# Patient Record
Sex: MALE | Race: WHITE | NOT HISPANIC OR LATINO | URBAN - METROPOLITAN AREA
[De-identification: names, ages, dates, MRNs, and addresses within clinical notes are randomized per-mention and may not be internally consistent; named-entity substitution may affect disease eponyms.]

---

## 2021-02-11 DIAGNOSIS — Z23 ENCOUNTER FOR IMMUNIZATION: ICD-10-CM

## 2021-03-01 ENCOUNTER — IMMUNIZATIONS (OUTPATIENT)
Dept: FAMILY MEDICINE CLINIC | Facility: HOSPITAL | Age: 81
End: 2021-03-01

## 2021-03-01 DIAGNOSIS — Z23 ENCOUNTER FOR IMMUNIZATION: Primary | ICD-10-CM

## 2021-03-01 PROCEDURE — 0001A SARS-COV-2 / COVID-19 MRNA VACCINE (PFIZER-BIONTECH) 30 MCG: CPT

## 2021-03-01 PROCEDURE — 91300 SARS-COV-2 / COVID-19 MRNA VACCINE (PFIZER-BIONTECH) 30 MCG: CPT

## 2021-03-22 ENCOUNTER — IMMUNIZATIONS (OUTPATIENT)
Dept: FAMILY MEDICINE CLINIC | Facility: HOSPITAL | Age: 81
End: 2021-03-22

## 2021-03-22 DIAGNOSIS — Z23 ENCOUNTER FOR IMMUNIZATION: Primary | ICD-10-CM

## 2021-03-22 PROCEDURE — 91300 SARS-COV-2 / COVID-19 MRNA VACCINE (PFIZER-BIONTECH) 30 MCG: CPT

## 2021-03-22 PROCEDURE — 0002A SARS-COV-2 / COVID-19 MRNA VACCINE (PFIZER-BIONTECH) 30 MCG: CPT

## 2021-09-28 ENCOUNTER — IMMUNIZATIONS (OUTPATIENT)
Dept: FAMILY MEDICINE CLINIC | Facility: HOSPITAL | Age: 81
End: 2021-09-28

## 2021-09-28 DIAGNOSIS — Z23 ENCOUNTER FOR IMMUNIZATION: Primary | ICD-10-CM

## 2021-09-28 PROCEDURE — 91300 SARS-COV-2 / COVID-19 MRNA VACCINE (PFIZER-BIONTECH) 30 MCG: CPT

## 2021-09-28 PROCEDURE — 0001A SARS-COV-2 / COVID-19 MRNA VACCINE (PFIZER-BIONTECH) 30 MCG: CPT

## 2023-01-13 ENCOUNTER — TELEPHONE (OUTPATIENT)
Dept: SURGERY | Facility: CLINIC | Age: 83
End: 2023-01-13

## 2023-01-13 NOTE — TELEPHONE ENCOUNTER
REFERRLA FROM FAX  [facility]  DR MAR LYNNE  [phone]      CALLED PT TO SET CONSULT FOR  DX: MASS OF SUBCUTANIEOUS TISSUE OF RIGHT LOWER LEG  ICD10 : R22 41      No answer   Left detailed msg to call us to schedule

## 2023-02-03 ENCOUNTER — CONSULT (OUTPATIENT)
Dept: SURGERY | Facility: CLINIC | Age: 83
End: 2023-02-03

## 2023-02-03 VITALS
BODY MASS INDEX: 37.94 KG/M2 | HEART RATE: 75 BPM | HEIGHT: 70 IN | DIASTOLIC BLOOD PRESSURE: 98 MMHG | SYSTOLIC BLOOD PRESSURE: 144 MMHG | WEIGHT: 265 LBS

## 2023-02-03 DIAGNOSIS — M79.89 MASS OF SOFT TISSUE OF RIGHT LOWER EXTREMITY: Primary | ICD-10-CM

## 2023-02-03 RX ORDER — ATORVASTATIN CALCIUM 10 MG/1
TABLET, FILM COATED ORAL
COMMUNITY

## 2023-02-03 RX ORDER — ASPIRIN 81 MG/1
TABLET ORAL
COMMUNITY

## 2023-02-03 RX ORDER — OLMESARTAN MEDOXOMIL 20 MG/1
TABLET ORAL
COMMUNITY

## 2023-02-03 RX ORDER — METFORMIN HYDROCHLORIDE 500 MG/5ML
SOLUTION ORAL
COMMUNITY
Start: 2023-01-20

## 2023-02-03 RX ORDER — METHENAMINE HIPPURATE 1000 MG/1
TABLET ORAL
COMMUNITY
Start: 2022-06-01

## 2023-02-03 RX ORDER — LORAZEPAM 0.5 MG
TABLET ORAL
COMMUNITY
Start: 2022-02-01

## 2023-02-03 RX ORDER — OLMESARTAN MEDOXOMIL AND HYDROCHLOROTHIAZIDE 40/25 40; 25 MG/1; MG/1
TABLET ORAL
COMMUNITY

## 2023-02-03 NOTE — PROGRESS NOTES
Consult- General Surgery   Tamica Jones [de-identified] y o  male MRN: [id number]  Unit/Bed#:  Encounter: [id number]    Assessment/Plan     Assessment:  Soft tissue mass right lower extremity  History of hypertension  History of diabetes  History of hyperlipidemia  Morbid obesity  History of perforated diverticulitis with colovesical fistula, status post Rivera's and reversal 2018  Plan:  There is no evidence of growing or symptoms from the soft tissue mass, ultrasound was negative  I advised the patient for observation at this time  He will return to my office in 3 months for follow-up  History of Present Illness     HPI:  Xi Castle is a [de-identified] y o  male who presents to my office accompanied by his wife for evaluation of right lower extremity soft tissue mass  The patient noted a lump on the right lower extremity for approximately 3 months  He went to see his primary care physician and subsequently referred to us for surgical evaluation  In addition the patient had an ultrasound of this area, the actual test was performed in hospital in [place]  I read the report stating that there is no obvious cystic or solid lesion in the area, no evidence of mass on ultrasound  Patient does not recall any single event that provoked the lump, he denies having any trauma to the area  Patient is on baby aspirin  Review of Systems  The rest of the review of system total of 10 were negative except for the HPI  Historical Information   History reviewed  No pertinent past medical history  History reviewed  No pertinent surgical history    Social History   Social History     Substance and Sexual Activity   Alcohol Use None     Social History     Substance and Sexual Activity   Drug Use Not on file     Social History     Tobacco Use   Smoking Status Never   Smokeless Tobacco Never     Family History: non-contributory    Meds/Allergies   all medications and allergies reviewed     Current Outpatient Medications:   • aspirin (ECOTRIN LOW STRENGTH) 81 mg EC tablet, , Disp: , Rfl:   •  atorvastatin (LIPITOR) 10 mg tablet, , Disp: , Rfl:   •  Metformin HCl (RIOMET) 500 MG/5ML oral solution, , Disp: , Rfl:   •  methenamine hippurate (HIPREX) 1 g tablet, , Disp: , Rfl:   •  Misc Natural Products (Tart Cherry Advanced) CAPS, , Disp: , Rfl:   •  olmesartan (BENICAR) 20 mg tablet, , Disp: , Rfl:   •  olmesartan-hydrochlorothiazide (BENICAR HCT) 40-25 MG per tablet, 1 tab(s), Disp: , Rfl:   •  Probiotic Product (PROBIOTIC-10 PO), , Disp: , Rfl:   Not on File    Objective     Current Vitals:   Blood Pressure: 144/98 (02/03/23 1056)  Pulse: 75 (02/03/23 1056)  Height: 5' 10" (177 8 cm) (02/03/23 1056)  Weight - Scale: 120 kg (265 lb) (02/03/23 1056)    Physical Exam  Vitals and nursing note reviewed  Constitutional:       General: He is not in acute distress  Appearance: He is obese  Cardiovascular:      Rate and Rhythm: Normal rate and regular rhythm  Heart sounds: No murmur heard  Pulmonary:      Effort: No respiratory distress  Breath sounds: Normal breath sounds  Abdominal:      Palpations: Abdomen is soft  There is no mass  Tenderness: There is no abdominal tenderness  Comments: Abdomen is obese, soft, nondistended and nontender  There is a colostomy surgical scar without evidence of incisional hernia  Infraumbilical midline incision without evidence of incisional hernia  Musculoskeletal:      Comments: Both lower extremity have evidence of varicose veins, mild edema  There is a 3 cm soft tissue mass in the anterior aspect of the right lower extremity, appears to be adhered to deeper structures, no skin color changes  Skin:     General: Skin is warm  Coloration: Skin is not jaundiced  Findings: No erythema or rash  Neurological:      Mental Status: He is alert and oriented to person, place, and time  Cranial Nerves: No cranial nerve deficit     Psychiatric:         Mood and Affect: Mood normal          Behavior: Behavior normal

## 2023-05-02 RX ORDER — OMEPRAZOLE 20 MG/1
CAPSULE, DELAYED RELEASE ORAL
COMMUNITY

## 2023-05-04 ENCOUNTER — OFFICE VISIT (OUTPATIENT)
Dept: SURGERY | Facility: CLINIC | Age: 83
End: 2023-05-04

## 2023-05-04 VITALS
OXYGEN SATURATION: 93 % | HEART RATE: 88 BPM | TEMPERATURE: 98.8 F | BODY MASS INDEX: 38.39 KG/M2 | RESPIRATION RATE: 16 BRPM | HEIGHT: 70 IN | WEIGHT: 268.2 LBS | SYSTOLIC BLOOD PRESSURE: 152 MMHG | DIASTOLIC BLOOD PRESSURE: 80 MMHG

## 2023-05-04 DIAGNOSIS — M79.89 MASS OF SOFT TISSUE OF RIGHT LOWER EXTREMITY: Primary | ICD-10-CM

## 2023-05-04 NOTE — PROGRESS NOTES
Follow Up - General Surgery   Tamica Jones [de-identified] y o  male MRN: [id number]  Unit/Bed#:  Encounter: [id number]    Assessment/Plan     Assessment:  Soft tissue mass right lower extremity, resolved on its own  History of hypertension  History of diabetes  History hyperlipidemia  Morbid obesity with BMI of 38 4  History of perforated diverticulitis with colovesical fistula, status post Rivera's and reversal 2018  Plan:  She is doing well, no further work-up or intervention is needed since the lump resolved on its own  Patient is discharged from my care  History of Present Illness     HPI:  Rodney Diaz is a [de-identified] y o  male who presents to my office accompanied by his wife for follow-up  Patient stated that the lump has disappeared on its own  He denies having any pain, discomfort or any other constitutional symptoms  The patient was originally seen in my office on February 3, 2023 for a lump on the right lower extremity  Ultrasound was negative at that time  Review of Systems  The rest of the review of system total of 10 were negative except for the HPI  Historical Information   History reviewed  No pertinent past medical history  History reviewed  No pertinent surgical history    Social History   Social History     Substance and Sexual Activity   Alcohol Use Never     Social History     Substance and Sexual Activity   Drug Use Never     Social History     Tobacco Use   Smoking Status Former    Packs/day: 1 50    Years: 40 00    Pack years: 60 00    Types: Cigarettes    Quit date: 5    Years since quittin 3    Passive exposure: Past   Smokeless Tobacco Never     Family History: non-contributory    Meds/Allergies   all medications and allergies reviewed     Current Outpatient Medications:     aspirin (ECOTRIN LOW STRENGTH) 81 mg EC tablet, , Disp: , Rfl:     atorvastatin (LIPITOR) 10 mg tablet, , Disp: , Rfl:     Fish Oil Triglycerides 10 GM/100ML EMUL, , Disp: , Rfl:     Metformin "HCl (RIOMET) 500 MG/5ML oral solution, , Disp: , Rfl:     methenamine hippurate (HIPREX) 1 g tablet, , Disp: , Rfl:     Misc Natural Products (Tart Cherry Advanced) CAPS, , Disp: , Rfl:     olmesartan (BENICAR) 20 mg tablet, , Disp: , Rfl:     olmesartan-hydrochlorothiazide (BENICAR HCT) 40-25 MG per tablet, 1 tab(s), Disp: , Rfl:     omeprazole (PriLOSEC) 20 mg delayed release capsule, , Disp: , Rfl:     Probiotic Product (PROBIOTIC-10 PO), , Disp: , Rfl:   Allergies   Allergen Reactions    Apple - Food Allergy Throat Swelling    Cherry - Food Allergy Throat Swelling       Objective     Current Vitals:   Blood Pressure: 152/80 (05/04/23 1046)  Pulse: 88 (05/04/23 1046)  Temperature: 98 8 °F (37 1 °C) (05/04/23 1046)  Temp Source: Temporal (05/04/23 1046)  Respirations: 16 (05/04/23 1046)  Height: 5' 10\" (177 8 cm) (05/04/23 1046)  Weight - Scale: 122 kg (268 lb 3 2 oz) (05/04/23 1046)  SpO2: 93 % (05/04/23 1046)    Physical Exam  Vitals and nursing note reviewed  Constitutional:       General: He is not in acute distress  Appearance: He is obese  Cardiovascular:      Rate and Rhythm: Normal rate and regular rhythm  Pulmonary:      Effort: No respiratory distress  Breath sounds: Normal breath sounds  Abdominal:      Palpations: Abdomen is soft  There is no mass  Tenderness: There is no abdominal tenderness  Musculoskeletal:      Comments: There is no longer a soft tissue mass in the right lower extremity  Skin:     General: Skin is warm  Coloration: Skin is not jaundiced  Findings: No erythema or rash  Neurological:      Mental Status: He is alert and oriented to person, place, and time  Cranial Nerves: No cranial nerve deficit     Psychiatric:         Mood and Affect: Mood normal          Behavior: Behavior normal          "

## 2024-05-11 ENCOUNTER — HOSPITAL ENCOUNTER (INPATIENT)
Facility: HOSPITAL | Age: 84
LOS: 1 days | Discharge: HOME | DRG: 310 | End: 2024-05-13
Attending: EMERGENCY MEDICINE | Admitting: INTERNAL MEDICINE
Payer: MEDICARE

## 2024-05-11 ENCOUNTER — APPOINTMENT (EMERGENCY)
Dept: RADIOLOGY | Facility: HOSPITAL | Age: 84
DRG: 310 | End: 2024-05-11
Payer: MEDICARE

## 2024-05-11 DIAGNOSIS — R00.1 BRADYCARDIA: Primary | ICD-10-CM

## 2024-05-11 PROBLEM — E66.01 CLASS 2 SEVERE OBESITY DUE TO EXCESS CALORIES WITH SERIOUS COMORBIDITY AND BODY MASS INDEX (BMI) OF 38.0 TO 38.9 IN ADULT (HCC): Status: ACTIVE | Noted: 2024-05-11

## 2024-05-11 PROBLEM — R55 SYNCOPE: Status: ACTIVE | Noted: 2024-05-11

## 2024-05-11 PROBLEM — E11.9 DM2 (DIABETES MELLITUS, TYPE 2) (HCC): Status: ACTIVE | Noted: 2024-05-11

## 2024-05-11 LAB
2HR DELTA HS TROPONIN: -3 NG/L
ALBUMIN SERPL BCP-MCNC: 4 G/DL (ref 3.5–5)
ALP SERPL-CCNC: 87 U/L (ref 34–104)
ALT SERPL W P-5'-P-CCNC: 53 U/L (ref 7–52)
ANION GAP SERPL CALCULATED.3IONS-SCNC: 6 MMOL/L (ref 4–13)
AST SERPL W P-5'-P-CCNC: 35 U/L (ref 13–39)
BASOPHILS # BLD AUTO: 0.09 THOUSANDS/ÂΜL (ref 0–0.1)
BASOPHILS NFR BLD AUTO: 1 % (ref 0–1)
BILIRUB SERPL-MCNC: 0.6 MG/DL (ref 0.2–1)
BUN SERPL-MCNC: 21 MG/DL (ref 5–25)
CALCIUM SERPL-MCNC: 8.9 MG/DL (ref 8.4–10.2)
CARDIAC TROPONIN I PNL SERPL HS: 13 NG/L
CARDIAC TROPONIN I PNL SERPL HS: 16 NG/L
CHLORIDE SERPL-SCNC: 104 MMOL/L (ref 96–108)
CO2 SERPL-SCNC: 27 MMOL/L (ref 21–32)
CREAT SERPL-MCNC: 1.25 MG/DL (ref 0.6–1.3)
EOSINOPHIL # BLD AUTO: 0.32 THOUSAND/ÂΜL (ref 0–0.61)
EOSINOPHIL NFR BLD AUTO: 4 % (ref 0–6)
ERYTHROCYTE [DISTWIDTH] IN BLOOD BY AUTOMATED COUNT: 12.7 % (ref 11.6–15.1)
GFR SERPL CREATININE-BSD FRML MDRD: 53 ML/MIN/1.73SQ M
GLUCOSE SERPL-MCNC: 110 MG/DL (ref 65–140)
GLUCOSE SERPL-MCNC: 114 MG/DL (ref 65–140)
GLUCOSE SERPL-MCNC: 124 MG/DL (ref 65–140)
HCT VFR BLD AUTO: 39.2 % (ref 36.5–49.3)
HGB BLD-MCNC: 12.9 G/DL (ref 12–17)
IMM GRANULOCYTES # BLD AUTO: 0.02 THOUSAND/UL (ref 0–0.2)
IMM GRANULOCYTES NFR BLD AUTO: 0 % (ref 0–2)
LYMPHOCYTES # BLD AUTO: 1.3 THOUSANDS/ÂΜL (ref 0.6–4.47)
LYMPHOCYTES NFR BLD AUTO: 17 % (ref 14–44)
MCH RBC QN AUTO: 33.9 PG (ref 26.8–34.3)
MCHC RBC AUTO-ENTMCNC: 32.9 G/DL (ref 31.4–37.4)
MCV RBC AUTO: 103 FL (ref 82–98)
MONOCYTES # BLD AUTO: 0.98 THOUSAND/ÂΜL (ref 0.17–1.22)
MONOCYTES NFR BLD AUTO: 13 % (ref 4–12)
NEUTROPHILS # BLD AUTO: 4.76 THOUSANDS/ÂΜL (ref 1.85–7.62)
NEUTS SEG NFR BLD AUTO: 65 % (ref 43–75)
NRBC BLD AUTO-RTO: 0 /100 WBCS
PLATELET # BLD AUTO: 154 THOUSANDS/UL (ref 149–390)
PMV BLD AUTO: 11.5 FL (ref 8.9–12.7)
POTASSIUM SERPL-SCNC: 5.2 MMOL/L (ref 3.5–5.3)
PROT SERPL-MCNC: 7.1 G/DL (ref 6.4–8.4)
RBC # BLD AUTO: 3.81 MILLION/UL (ref 3.88–5.62)
SODIUM SERPL-SCNC: 137 MMOL/L (ref 135–147)
WBC # BLD AUTO: 7.47 THOUSAND/UL (ref 4.31–10.16)

## 2024-05-11 PROCEDURE — 99223 1ST HOSP IP/OBS HIGH 75: CPT | Performed by: INTERNAL MEDICINE

## 2024-05-11 PROCEDURE — 82948 REAGENT STRIP/BLOOD GLUCOSE: CPT

## 2024-05-11 PROCEDURE — 93005 ELECTROCARDIOGRAM TRACING: CPT

## 2024-05-11 PROCEDURE — 96361 HYDRATE IV INFUSION ADD-ON: CPT

## 2024-05-11 PROCEDURE — 36415 COLL VENOUS BLD VENIPUNCTURE: CPT | Performed by: EMERGENCY MEDICINE

## 2024-05-11 PROCEDURE — 85025 COMPLETE CBC W/AUTO DIFF WBC: CPT | Performed by: EMERGENCY MEDICINE

## 2024-05-11 PROCEDURE — 71045 X-RAY EXAM CHEST 1 VIEW: CPT

## 2024-05-11 PROCEDURE — 84484 ASSAY OF TROPONIN QUANT: CPT | Performed by: EMERGENCY MEDICINE

## 2024-05-11 PROCEDURE — 80053 COMPREHEN METABOLIC PANEL: CPT | Performed by: EMERGENCY MEDICINE

## 2024-05-11 PROCEDURE — 83036 HEMOGLOBIN GLYCOSYLATED A1C: CPT | Performed by: INTERNAL MEDICINE

## 2024-05-11 PROCEDURE — 99284 EMERGENCY DEPT VISIT MOD MDM: CPT | Performed by: EMERGENCY MEDICINE

## 2024-05-11 PROCEDURE — 96360 HYDRATION IV INFUSION INIT: CPT

## 2024-05-11 PROCEDURE — 99284 EMERGENCY DEPT VISIT MOD MDM: CPT

## 2024-05-11 RX ORDER — DOCUSATE SODIUM 100 MG/1
100 CAPSULE, LIQUID FILLED ORAL 2 TIMES DAILY
Status: DISCONTINUED | OUTPATIENT
Start: 2024-05-11 | End: 2024-05-13 | Stop reason: HOSPADM

## 2024-05-11 RX ORDER — INSULIN LISPRO 100 [IU]/ML
2-12 INJECTION, SOLUTION INTRAVENOUS; SUBCUTANEOUS
Status: DISCONTINUED | OUTPATIENT
Start: 2024-05-12 | End: 2024-05-13 | Stop reason: HOSPADM

## 2024-05-11 RX ORDER — METHENAMINE HIPPURATE 1000 MG/1
1 TABLET ORAL EVERY EVENING
Status: DISCONTINUED | OUTPATIENT
Start: 2024-05-11 | End: 2024-05-12

## 2024-05-11 RX ORDER — HYDROCHLOROTHIAZIDE 25 MG/1
25 TABLET ORAL DAILY
Status: DISCONTINUED | OUTPATIENT
Start: 2024-05-12 | End: 2024-05-13 | Stop reason: HOSPADM

## 2024-05-11 RX ORDER — ONDANSETRON 2 MG/ML
4 INJECTION INTRAMUSCULAR; INTRAVENOUS EVERY 6 HOURS PRN
Status: DISCONTINUED | OUTPATIENT
Start: 2024-05-11 | End: 2024-05-13 | Stop reason: HOSPADM

## 2024-05-11 RX ORDER — LOSARTAN POTASSIUM 50 MG/1
100 TABLET ORAL DAILY
Status: DISCONTINUED | OUTPATIENT
Start: 2024-05-12 | End: 2024-05-13 | Stop reason: HOSPADM

## 2024-05-11 RX ORDER — INSULIN LISPRO 100 [IU]/ML
2-12 INJECTION, SOLUTION INTRAVENOUS; SUBCUTANEOUS
Status: DISCONTINUED | OUTPATIENT
Start: 2024-05-11 | End: 2024-05-13 | Stop reason: HOSPADM

## 2024-05-11 RX ORDER — ATORVASTATIN CALCIUM 10 MG/1
10 TABLET, FILM COATED ORAL
Status: DISCONTINUED | OUTPATIENT
Start: 2024-05-12 | End: 2024-05-13 | Stop reason: HOSPADM

## 2024-05-11 RX ORDER — INSULIN GLARGINE 100 [IU]/ML
10 INJECTION, SOLUTION SUBCUTANEOUS
Status: DISCONTINUED | OUTPATIENT
Start: 2024-05-11 | End: 2024-05-13 | Stop reason: HOSPADM

## 2024-05-11 RX ORDER — MELOXICAM 15 MG/1
15 TABLET ORAL DAILY
COMMUNITY

## 2024-05-11 RX ORDER — ENOXAPARIN SODIUM 100 MG/ML
40 INJECTION SUBCUTANEOUS DAILY
Status: DISCONTINUED | OUTPATIENT
Start: 2024-05-12 | End: 2024-05-13 | Stop reason: HOSPADM

## 2024-05-11 RX ORDER — SODIUM CHLORIDE, SODIUM GLUCONATE, SODIUM ACETATE, POTASSIUM CHLORIDE, MAGNESIUM CHLORIDE, SODIUM PHOSPHATE, DIBASIC, AND POTASSIUM PHOSPHATE .53; .5; .37; .037; .03; .012; .00082 G/100ML; G/100ML; G/100ML; G/100ML; G/100ML; G/100ML; G/100ML
100 INJECTION, SOLUTION INTRAVENOUS CONTINUOUS
Status: DISCONTINUED | OUTPATIENT
Start: 2024-05-11 | End: 2024-05-12

## 2024-05-11 RX ORDER — PANTOPRAZOLE SODIUM 40 MG/1
40 TABLET, DELAYED RELEASE ORAL
Status: DISCONTINUED | OUTPATIENT
Start: 2024-05-12 | End: 2024-05-13 | Stop reason: HOSPADM

## 2024-05-11 RX ORDER — ACETAMINOPHEN 325 MG/1
975 TABLET ORAL EVERY 6 HOURS PRN
Status: DISCONTINUED | OUTPATIENT
Start: 2024-05-11 | End: 2024-05-13 | Stop reason: HOSPADM

## 2024-05-11 RX ORDER — MAGNESIUM HYDROXIDE/ALUMINUM HYDROXICE/SIMETHICONE 120; 1200; 1200 MG/30ML; MG/30ML; MG/30ML
30 SUSPENSION ORAL EVERY 6 HOURS PRN
Status: DISCONTINUED | OUTPATIENT
Start: 2024-05-11 | End: 2024-05-13 | Stop reason: HOSPADM

## 2024-05-11 RX ADMIN — SODIUM CHLORIDE, SODIUM GLUCONATE, SODIUM ACETATE, POTASSIUM CHLORIDE, MAGNESIUM CHLORIDE, SODIUM PHOSPHATE, DIBASIC, AND POTASSIUM PHOSPHATE 100 ML/HR: .53; .5; .37; .037; .03; .012; .00082 INJECTION, SOLUTION INTRAVENOUS at 23:01

## 2024-05-11 RX ADMIN — SODIUM CHLORIDE 1000 ML: 0.9 INJECTION, SOLUTION INTRAVENOUS at 17:18

## 2024-05-11 RX ADMIN — METHENAMINE HIPPURATE 1 G: 1 TABLET ORAL at 23:49

## 2024-05-12 ENCOUNTER — APPOINTMENT (OUTPATIENT)
Dept: NON INVASIVE DIAGNOSTICS | Facility: HOSPITAL | Age: 84
DRG: 310 | End: 2024-05-12
Payer: MEDICARE

## 2024-05-12 PROBLEM — E83.42 HYPOMAGNESEMIA: Status: ACTIVE | Noted: 2024-05-12

## 2024-05-12 LAB
ALBUMIN SERPL BCP-MCNC: 3.5 G/DL (ref 3.5–5)
ALP SERPL-CCNC: 76 U/L (ref 34–104)
ALT SERPL W P-5'-P-CCNC: 50 U/L (ref 7–52)
ANION GAP SERPL CALCULATED.3IONS-SCNC: 7 MMOL/L (ref 4–13)
AST SERPL W P-5'-P-CCNC: 33 U/L (ref 13–39)
ATRIAL RATE: 41 BPM
ATRIAL RATE: 68 BPM
BASOPHILS # BLD AUTO: 0.09 THOUSANDS/ÂΜL (ref 0–0.1)
BASOPHILS NFR BLD AUTO: 1 % (ref 0–1)
BILIRUB SERPL-MCNC: 0.81 MG/DL (ref 0.2–1)
BUN SERPL-MCNC: 16 MG/DL (ref 5–25)
CALCIUM SERPL-MCNC: 8.7 MG/DL (ref 8.4–10.2)
CHLORIDE SERPL-SCNC: 105 MMOL/L (ref 96–108)
CO2 SERPL-SCNC: 28 MMOL/L (ref 21–32)
CREAT SERPL-MCNC: 1.05 MG/DL (ref 0.6–1.3)
EOSINOPHIL # BLD AUTO: 0.41 THOUSAND/ÂΜL (ref 0–0.61)
EOSINOPHIL NFR BLD AUTO: 6 % (ref 0–6)
ERYTHROCYTE [DISTWIDTH] IN BLOOD BY AUTOMATED COUNT: 12.7 % (ref 11.6–15.1)
EST. AVERAGE GLUCOSE BLD GHB EST-MCNC: 174 MG/DL
GFR SERPL CREATININE-BSD FRML MDRD: 66 ML/MIN/1.73SQ M
GLUCOSE P FAST SERPL-MCNC: 118 MG/DL (ref 65–99)
GLUCOSE SERPL-MCNC: 118 MG/DL (ref 65–140)
GLUCOSE SERPL-MCNC: 126 MG/DL (ref 65–140)
GLUCOSE SERPL-MCNC: 159 MG/DL (ref 65–140)
GLUCOSE SERPL-MCNC: 168 MG/DL (ref 65–140)
GLUCOSE SERPL-MCNC: 99 MG/DL (ref 65–140)
HBA1C MFR BLD: 7.7 %
HCT VFR BLD AUTO: 38.6 % (ref 36.5–49.3)
HGB BLD-MCNC: 12.5 G/DL (ref 12–17)
IMM GRANULOCYTES # BLD AUTO: 0.01 THOUSAND/UL (ref 0–0.2)
IMM GRANULOCYTES NFR BLD AUTO: 0 % (ref 0–2)
LYMPHOCYTES # BLD AUTO: 1.61 THOUSANDS/ÂΜL (ref 0.6–4.47)
LYMPHOCYTES NFR BLD AUTO: 23 % (ref 14–44)
MAGNESIUM SERPL-MCNC: 1.5 MG/DL (ref 1.9–2.7)
MCH RBC QN AUTO: 33.6 PG (ref 26.8–34.3)
MCHC RBC AUTO-ENTMCNC: 32.4 G/DL (ref 31.4–37.4)
MCV RBC AUTO: 104 FL (ref 82–98)
MONOCYTES # BLD AUTO: 0.89 THOUSAND/ÂΜL (ref 0.17–1.22)
MONOCYTES NFR BLD AUTO: 13 % (ref 4–12)
NEUTROPHILS # BLD AUTO: 4.08 THOUSANDS/ÂΜL (ref 1.85–7.62)
NEUTS SEG NFR BLD AUTO: 57 % (ref 43–75)
NRBC BLD AUTO-RTO: 0 /100 WBCS
P AXIS: 86 DEGREES
P AXIS: 87 DEGREES
PLATELET # BLD AUTO: 144 THOUSANDS/UL (ref 149–390)
PMV BLD AUTO: 11.7 FL (ref 8.9–12.7)
POTASSIUM SERPL-SCNC: 4.6 MMOL/L (ref 3.5–5.3)
PR INTERVAL: 180 MS
PR INTERVAL: 190 MS
PROT SERPL-MCNC: 6.5 G/DL (ref 6.4–8.4)
QRS AXIS: 102 DEGREES
QRS AXIS: 102 DEGREES
QRSD INTERVAL: 138 MS
QRSD INTERVAL: 140 MS
QT INTERVAL: 456 MS
QT INTERVAL: 508 MS
QTC INTERVAL: 419 MS
QTC INTERVAL: 484 MS
RBC # BLD AUTO: 3.72 MILLION/UL (ref 3.88–5.62)
SODIUM SERPL-SCNC: 140 MMOL/L (ref 135–147)
T WAVE AXIS: 120 DEGREES
T WAVE AXIS: 127 DEGREES
TSH SERPL DL<=0.05 MIU/L-ACNC: 2.54 UIU/ML (ref 0.45–4.5)
VENTRICULAR RATE: 41 BPM
VENTRICULAR RATE: 68 BPM
WBC # BLD AUTO: 7.09 THOUSAND/UL (ref 4.31–10.16)

## 2024-05-12 PROCEDURE — 80053 COMPREHEN METABOLIC PANEL: CPT | Performed by: INTERNAL MEDICINE

## 2024-05-12 PROCEDURE — 83735 ASSAY OF MAGNESIUM: CPT | Performed by: INTERNAL MEDICINE

## 2024-05-12 PROCEDURE — 97165 OT EVAL LOW COMPLEX 30 MIN: CPT

## 2024-05-12 PROCEDURE — 84443 ASSAY THYROID STIM HORMONE: CPT | Performed by: NURSE PRACTITIONER

## 2024-05-12 PROCEDURE — 85025 COMPLETE CBC W/AUTO DIFF WBC: CPT | Performed by: INTERNAL MEDICINE

## 2024-05-12 PROCEDURE — 82948 REAGENT STRIP/BLOOD GLUCOSE: CPT

## 2024-05-12 PROCEDURE — 93010 ELECTROCARDIOGRAM REPORT: CPT | Performed by: INTERNAL MEDICINE

## 2024-05-12 PROCEDURE — 99232 SBSQ HOSP IP/OBS MODERATE 35: CPT | Performed by: NURSE PRACTITIONER

## 2024-05-12 PROCEDURE — 97162 PT EVAL MOD COMPLEX 30 MIN: CPT

## 2024-05-12 RX ORDER — METHENAMINE HIPPURATE 1000 MG/1
1 TABLET ORAL 2 TIMES DAILY
Status: DISCONTINUED | OUTPATIENT
Start: 2024-05-12 | End: 2024-05-13 | Stop reason: HOSPADM

## 2024-05-12 RX ORDER — MAGNESIUM SULFATE HEPTAHYDRATE 40 MG/ML
4 INJECTION, SOLUTION INTRAVENOUS ONCE
Qty: 100 ML | Refills: 0 | Status: COMPLETED | OUTPATIENT
Start: 2024-05-12 | End: 2024-05-13

## 2024-05-12 RX ADMIN — INSULIN LISPRO 2 UNITS: 100 INJECTION, SOLUTION INTRAVENOUS; SUBCUTANEOUS at 12:29

## 2024-05-12 RX ADMIN — ASPIRIN 81 MG: 81 TABLET, COATED ORAL at 11:00

## 2024-05-12 RX ADMIN — DOCUSATE SODIUM 100 MG: 100 CAPSULE, LIQUID FILLED ORAL at 17:18

## 2024-05-12 RX ADMIN — LOSARTAN POTASSIUM 100 MG: 50 TABLET, FILM COATED ORAL at 11:00

## 2024-05-12 RX ADMIN — INSULIN LISPRO 2 UNITS: 100 INJECTION, SOLUTION INTRAVENOUS; SUBCUTANEOUS at 17:20

## 2024-05-12 RX ADMIN — DOCUSATE SODIUM 100 MG: 100 CAPSULE, LIQUID FILLED ORAL at 11:00

## 2024-05-12 RX ADMIN — METHENAMINE HIPPURATE 1 G: 1 TABLET ORAL at 21:57

## 2024-05-12 RX ADMIN — METHENAMINE HIPPURATE 1 G: 1 TABLET ORAL at 10:59

## 2024-05-12 RX ADMIN — HYDROCHLOROTHIAZIDE 25 MG: 25 TABLET ORAL at 11:00

## 2024-05-12 RX ADMIN — ATORVASTATIN CALCIUM 10 MG: 10 TABLET, FILM COATED ORAL at 17:18

## 2024-05-12 RX ADMIN — MAGNESIUM SULFATE HEPTAHYDRATE 4 G: 40 INJECTION, SOLUTION INTRAVENOUS at 10:59

## 2024-05-12 RX ADMIN — PANTOPRAZOLE SODIUM 40 MG: 40 TABLET, DELAYED RELEASE ORAL at 05:04

## 2024-05-12 RX ADMIN — ENOXAPARIN SODIUM 40 MG: 40 INJECTION SUBCUTANEOUS at 11:00

## 2024-05-12 NOTE — OCCUPATIONAL THERAPY NOTE
Occupational Therapy Evaluation        Patient Name: Brian Jones  Today's Date: 5/12/2024 05/12/24 0848   OT Last Visit   OT Visit Date 05/12/24   Note Type   Note type Evaluation   Additional Comments Chart review indicates recent falls and recurent  syncopal episodes, indicating need for OT evaluation , despite documented ADL AMPAC score of 24.  Therapist discussed findings with Nursing staff, patient did agreed that he was independent  prior to being hospitalized, no OOB mobility completed to this point.   Pain Assessment   Pain Assessment Tool 0-10   Pain Score No Pain   Restrictions/Precautions   Weight Bearing Precautions Per Order No   Braces or Orthoses Other (Comment)  (none reported)   Home Living   Type of Home House   Home Layout Two level;Performs ADLs on one level;Stairs to enter with rails  (6 EMMANUEL+ 7 inside steps to the second floor)   Bathroom Shower/Tub Walk-in shower   Bathroom Toilet Standard   Bathroom Equipment Shower chair   Bathroom Accessibility Accessible   Home Equipment Other (Comment)  (none at baseline)   Prior Function   Level of [place] Independent with ADLs   Lives With Spouse;Family   Receives Help From Family   IADLs Independent with driving;Independent with meal prep;Independent with medication management   Falls in the last 6 months 1 to 4  (1 fall + 4 other syncopal episodes)   Vocational Retired   Lifestyle   Autonomy Patient reported independent  with ADLs/ IADLs. Patient lives with spouse in a 2 story house, 6 EMMANUEL+ 7 inside steps to the second floor. Patient ambulates without AD.   Reciprocal Relationships Supportive Family   Service to Others Retired Business Owner   Intrinsic Gratification President of [employer]   General   Family/Caregiver Present No   ADL   Where Assessed Edge of bed   Eating Assistance 7  Independent   Grooming Assistance 5  Supervision/Setup   UB Bathing Assistance 5  Supervision/Setup   LB Bathing Assistance  5  Supervision/Setup   UB Dressing Assistance 5  Supervision/Setup   LB Dressing Assistance 5  Supervision/Setup   Toileting Assistance  5  Supervision/Setup   Functional Assistance 5  Supervision/Setup   Bed Mobility   Supine to Sit 5  Supervision   Additional items Assist x 1;HOB elevated;Bedrails;Verbal cues   Additional Comments Pt reported mild dizziness upon sitting at EOB; resolved with a seated rest break.   Transfers   Sit to Stand 5  Supervision   Additional items Assist x 1;Increased time required;Verbal cues   Stand to Sit 5  Supervision   Additional items Assist x 1;Increased time required;Verbal cues   Functional Mobility   Functional Mobility 5  Supervision   Additional Comments close contact guard   Balance   Static Sitting Good   Dynamic Sitting Fair +   Static Standing Fair   Dynamic Standing Fair -   Activity Tolerance   Activity Tolerance Treatment limited secondary to medical complications (Comment)  (Pt reported mild dizziness upon sitting at EOB; resolved with a seated rest break.)   Medical Staff Made Aware Co-evaluation performed with OT secondary to complex medical condition of patient and regression of functional status from baseline   RUE Assessment   RUE Assessment WFL   LUE Assessment   LUE Assessment WFL   Hand Function   Gross Motor Coordination Functional   Fine Motor Coordination Functional   Sensation   Light Touch No apparent deficits  (BUEs)   Vision-Basic Assessment   Current Vision Wears glasses only for reading   Psychosocial   Psychosocial (WDL) WDL   Cognition   Overall Cognitive Status WFL   Arousal/Participation Alert;Responsive;Cooperative   Attention Within functional limits   Orientation Level Oriented X4   Memory Within functional limits   Following Commands Follows all commands and directions without difficulty   Assessment   Assessment Patient is a 83 y.o. male seen for OT evaluation s/p admit to [facility] on [facility]/11/2024 w/Syncope. Commorbidities  affecting patient's functional performance at time of assessment include:  class 2 severe obesity, bradycardia, type 2 DM, and hypomagnesemia. Presented to ED with recurrent syncopal episodes and overall weakness.  Orders placed for OT evaluation and treatment   Performed at least two patient identifiers during session including name and wristband. Prior to admission,  Patient reported independent with ADLs/ IADLs. Patient lives with spouse in a 2 story house, 6 EMMANUEL+ 7 inside steps to the second floor. Patient ambulates without AD. Upon evaluation, patient requires supervision and set up assist for UB ADLs, supervision and contact guard assist for LB ADLs, transfers and functional ambulation in room and bathroom with contact guard and close supervision assist. Presents with functional use of BUEs, with intact prehension, coordination and symmetrical muscle strength. Recommend supervised mobility in room secondary to mild unsteadiness with episodes of lightheadedness. Nursing staff made aware of therapy outcome. No further acute OT needs identified at this time to warrant continuation of services. D/C OT services. From OT standpoint, recommendation at time of d/c would be Home with family support.   Discharge Recommendation   Rehab Resource Intensity Level, OT No post-acute rehabilitation needs   AM-PAC Daily Activity Inpatient   Lower Body Dressing 3   Bathing 3   Toileting 3   Upper Body Dressing 4   Grooming 4   Eating 4   Daily Activity Raw Score 21   Daily Activity Standardized Score (Calc for Raw Score >=11) 44.27   AM-PAC Applied Cognition Inpatient   Following a Speech/Presentation 4   Understanding Ordinary Conversation 4   Taking Medications 4   Remembering Where Things Are Placed or Put Away 4   Remembering List of 4-5 Errands 4   Taking Care of Complicated Tasks 4   Applied Cognition Raw Score 24   Applied Cognition Standardized Score 62.21

## 2024-05-12 NOTE — PLAN OF CARE
Problem: PHYSICAL THERAPY ADULT  Goal: Performs mobility at highest level of function for planned discharge setting.  See evaluation for individualized goals.  Description: Treatment/Interventions: Functional transfer training, LE strengthening/ROM, Elevations, Therapeutic exercise, Endurance training, Patient/family training, Bed mobility, Gait training, Spoke to nursing, OT          See flowsheet documentation for full assessment, interventions and recommendations.  Note: Prognosis: Good  Problem List: Decreased strength, Decreased endurance, Impaired balance, Decreased mobility  Assessment: Pt is 83 year old male seen for PT evaluation s/p admit to [facility] on 5/11/2024 with Syncope. PT consulted to assess pt's functional mobility and discharge needs. Order placed for PT evaluation and treatment, with up and out of bed as tolerated order. Comorbidities affecting pt's physical performance at time of assessment include class 2 severe obesity, bradycardia, type 2 DM, and hypomagnesemia. Prior to hospitalization, pt was independent with all functional mobility without an AD. Pt ambulates unrestricted distances on all terrain and elevations. Pt resides with his spouse, in a two level house with six steps to enter. Personal factors affecting pt at time of initial evaluation include lives in a two story house, stairs to enter home, difficulty ambulating community distances, difficulty navigating level surfaces without external assistance, difficulty performing dynamic tasks in the community, positive fall history, and difficulty performing ADLs and IADLs. Please find objective findings from PT assessment regarding body systems outlined above with impairments and limitations including weakness, impaired balance, decreased endurance, gait deviations, decreased activity tolerance, decreased functional mobility tolerance, and fall risk. The following objective measures were performed on initial evaluation [place]  Index: 75/100, Modified [place]: 3 (moderate disability), and AM-PAC 6-Clicks: 19/24. Pt's clinical presentation is currently evolving seen in pt's presentation of need for ongoing medical management/monitoring, pt is a fall risk, and pt requires cues and assist for safety with functional mobility. Pt to benefit from continued PT treatment to address deficits as defined above and maximize pt's level of function and independence with mobility. From a PT standpoint, recommendation at time of discharge would be level 3, minimal resource intensity in order to facilitate return to prior level of function.    Barriers to Discharge: None     Rehab Resource Intensity Level, PT: III (Minimum Resource Intensity)    See flowsheet documentation for full assessment.

## 2024-05-12 NOTE — H&P
"[facility]   H&P  Name: Brian Jones I  MRN: [id number]  Unit/Bed#: -01 I Date of Admission: 5/11/2024   Date of Service: 5/11/2024 I Hospital Day: 0    * Syncope  Assessment & Plan  Reportedly had 3 episodes of passing out and shaking over the past 24 hours  EKG and that showed sinus bradycardia in the low 40s which subsequently improved  Patient has been on beta blocker since November; feeling sluggish  Improved with IV fluid hydration    Plan:  Monitor on telemetry   Check TSH, troponins, Blood sugars  IVF maintenance fluids  Echo to r/o cardiac etiology  Hold off on beta blocker      Type 2 diabetes mellitus without complication, without long-term current use of insulin ([facility])  Assessment & Plan  No results found for: \"HGBA1C\"    Recent Labs     05/11/24  1614   POCGLU 124       Blood Sugar Average: Last 72 hrs:  (P) 124  Hold home regimen while inpatient and resume on discharge  Diabetic diet  Insulin regimen  Lantus 10 units HS  Glucose checks and Insulin correction ACHS  Goal -180 while admitted, adjusting insulin regimen as appropriate  Monitor for hypoglycemia and treat per protocol      Bradycardia  Assessment & Plan  Pulse: 70   Bradycardic on EKG initially which did improve  Reportedly taking Toprol XL at home  Hold BB at this time  Monitor HR    Class 2 severe obesity due to excess calories with serious comorbidity and body mass index (BMI) of 38.0 to 38.9 in adult ([facility])  Assessment & Plan  Body mass index is 38.78 kg/m².    Recommend incorporating a more whole foods plant-predominant diet along with decreasing consumption of red meats and processed foods  Per AHA guidelines, recommend moderate-vigorous intensity exercise for 30 minutes a day for 5 days a week or a total of 150 min/week          VTE Pharmacologic Prophylaxis: VTE Score: 3 Moderate Risk (Score 3-4) - Pharmacological DVT Prophylaxis Ordered: enoxaparin (Lovenox).  Code Status: Level 1 - Full " "Code  Discussion with Patient/Family: patient    Anticipated Length of Stay: Patient will be admitted on an observation basis with an anticipated length of stay of less than 2 midnights secondary to plan above.    Total Time for Visit, including Counseling / Coordination of Care: 85 minutes Greater than 50% of this total time spent on direct patient counseling and coordination of care.    Chief Complaint:   Chief Complaint   Patient presents with    Medical Problem     Pt c/o several episodes over the past 2 weeks where \"I check out, I have a 5 second warning and then things go hazy and wavy for about 20 seconds and then I come back to\"        History of Present Illness:  Brian Jones is a 83 y.o. male who presents with 3 episodes of syncope.    PMHx of HTN, HLD, DM2    Reportedly for the past few days prior to admission has been having multiple episodes of light headed/dizziness intermittently. Denied doing strenous activity prior to these episodes. Reported that he had been on betablocker since November per his cardiologist in [place]. Also had nuclear stress testing performed which was reportedly negative (pending records to be received from [facility]).  Both patient and wife of patient feel that this may be 2/2 beta blocker medication which was split in half dosage a few months ago due to adverse effects.    Admitted for syncope work up and evaluation.    Review of Systems:  A 10-point review of systems was obtained.  Pertinent positives and negatives are outlined in the HPI above.  Remainder of review of systems are otherwise negative.     Past Medical and Surgical History:   History reviewed. No pertinent past medical history.    History reviewed. No pertinent surgical history.    Meds/Allergies:  Prior to Admission medications    Medication Sig Start Date End Date Taking? Authorizing Provider   aspirin (ECOTRIN LOW STRENGTH) 81 mg EC tablet     Historical Provider, MD   atorvastatin (LIPITOR) 10 mg tablet " "    Historical Provider, MD   Fish Oil Triglycerides 10 GM/100ML EMUL     Historical Provider, MD   Metformin HCl (RIOMET) 500 MG/5ML oral solution  23   Historical Provider, MD   methenamine hippurate (HIPREX) 1 g tablet  22   Historical Provider, MD   Misc Natural Products ([employer]) CAPS  22   Historical Provider, MD   olmesartan-hydrochlorothiazide (BENICAR HCT) 40-25 MG per tablet 1 tab(s)    Historical Provider, MD   omeprazole (PriLOSEC) 20 mg delayed release capsule     Historical Provider, MD   Probiotic Product (PROBIOTIC-10 PO)  6/3/22   Historical Provider, MD   olmesartan (BENICAR) 20 mg tablet   24  Historical Provider, MD     I have reviewed home medications with patient personally.    Allergies:   Allergies   Allergen Reactions    Apple - Food Allergy Throat Swelling    Cherry - Food Allergy Throat Swelling       Social History:  Marital Status: /Civil Union   Patient Pre-hospital Living Situation: Home  Patient Pre-hospital Level of Mobility: walks  Patient Pre-hospital Diet Restrictions: none  Substance Use History:   Social History     Substance and Sexual Activity   Alcohol Use Never     Social History     Tobacco Use   Smoking Status Former    Current packs/day: 0.00    Average packs/day: 1.5 packs/day for 40.0 years (60.0 ttl pk-yrs)    Types: Cigarettes    Start date:     Quit date:     Years since quittin.3    Passive exposure: Past   Smokeless Tobacco Never     Social History     Substance and Sexual Activity   Drug Use Never       Family History:  Family History   Problem Relation Age of Onset    Breast cancer additional onset Mother     No Known Problems Father        Physical Exam:     Vitals:   Blood Pressure: (!) 187/90 (24)  Pulse: (!) 44 (24)  Temperature: 98.4 °F (36.9 °C) (24 161)  Temp Source: Oral (24)  Respirations: 19 (24)  Height: 5' 10\" (177.8 cm) (24)  Weight - " Scale: 120 kg (265 lb 3.4 oz) (05/11/24 2234)  SpO2: 96 % (05/11/24 2202)    Physical Exam  Vitals reviewed.   Constitutional:       General: He is not in acute distress.     Appearance: He is well-developed. He is ill-appearing (Chronically). He is not diaphoretic.   HENT:      Head: Normocephalic and atraumatic.      Nose: Nose normal.   Eyes:      General: No scleral icterus.     Conjunctiva/sclera: Conjunctivae normal.      Pupils: Pupils are equal, round, and reactive to light.   Neck:      Thyroid: No thyromegaly.   Cardiovascular:      Rate and Rhythm: Normal rate and regular rhythm.      Heart sounds: Normal heart sounds. No murmur heard.  Pulmonary:      Effort: Pulmonary effort is normal.      Breath sounds: Normal breath sounds. No wheezing, rhonchi or rales.   Abdominal:      General: Bowel sounds are normal. There is no distension.      Palpations: Abdomen is soft.      Tenderness: There is no abdominal tenderness.   Musculoskeletal:         General: No swelling, tenderness or deformity.      Cervical back: Neck supple.   Skin:     General: Skin is warm and dry.   Neurological:      Mental Status: He is alert and oriented to person, place, and time. Mental status is at baseline.   Psychiatric:         Behavior: Behavior normal.         Thought Content: Thought content normal.         Judgment: Judgment normal.           Additional Data:     Lab Results:  Results from last 7 days   Lab Units 05/11/24  1718   WBC Thousand/uL 7.47   HEMOGLOBIN g/dL 12.9   HEMATOCRIT % 39.2   PLATELETS Thousands/uL 154   SEGS PCT % 65   LYMPHO PCT % 17   MONO PCT % 13*   EOS PCT % 4     Results from last 7 days   Lab Units 05/11/24  1718   SODIUM mmol/L 137   POTASSIUM mmol/L 5.2   CHLORIDE mmol/L 104   CO2 mmol/L 27   BUN mg/dL 21   CREATININE mg/dL 1.25   ANION GAP mmol/L 6   CALCIUM mg/dL 8.9   ALBUMIN g/dL 4.0   TOTAL BILIRUBIN mg/dL 0.60   ALK PHOS U/L 87   ALT U/L 53*   AST U/L 35   GLUCOSE RANDOM mg/dL 114          Results from last 7 days   Lab Units 05/11/24  2236 05/11/24  1614   POC GLUCOSE mg/dl 110 124               Imaging Results Reviewed as noted below  XR chest 1 view portable    (Results Pending)       No results found.  No Chest XR results available for this patient.       EKG and Other Studies Reviewed on Admission:   EKG: NSR, previously sinus pato    Recent Labs     05/11/24  1621   VENTRATE 68         ** Please Note: This note has been constructed using a voice recognition system. **

## 2024-05-12 NOTE — PLAN OF CARE
Problem: PAIN - ADULT  Goal: Verbalizes/displays adequate comfort level or baseline comfort level  Description: Interventions:  - Encourage patient to monitor pain and request assistance  - Assess pain using appropriate pain scale  - Administer analgesics based on type and severity of pain and evaluate response  - Implement non-pharmacological measures as appropriate and evaluate response  - Consider cultural and social influences on pain and pain management  - Notify physician/advanced practitioner if interventions unsuccessful or patient reports new pain  Outcome: Progressing     Problem: SAFETY ADULT  Goal: Patient will remain free of falls  Description: INTERVENTIONS:  - Educate patient/family on patient safety including physical limitations  - Instruct patient to call for assistance with activity   - Consult OT/PT to assist with strengthening/mobility   - Keep Call bell within reach  - Keep bed low and locked with side rails adjusted as appropriate  - Keep care items and personal belongings within reach  - Initiate and maintain comfort rounds  - Make Fall Risk Sign visible to staff  - Offer Toileting every 2 Hours, in advance of need  - Initiate/Maintain bedalarm  - Obtain necessary fall risk management equipment:   - Apply yellow socks and bracelet for high fall risk patients  - Consider moving patient to room near nurses station  Outcome: Progressing     Problem: DISCHARGE PLANNING  Goal: Discharge to home or other facility with appropriate resources  Description: INTERVENTIONS:  - Identify barriers to discharge w/patient and caregiver  - Arrange for needed discharge resources and transportation as appropriate  - Identify discharge learning needs (meds, wound care, etc.)  - Arrange for interpretive services to assist at discharge as needed  - Refer to Case Management Department for coordinating discharge planning if the patient needs post-hospital services based on physician/advanced practitioner order or  complex needs related to functional status, cognitive ability, or social support system  Outcome: Progressing     Problem: Knowledge Deficit  Goal: Patient/family/caregiver demonstrates understanding of disease process, treatment plan, medications, and discharge instructions  Description: Complete learning assessment and assess knowledge base.  Interventions:  - Provide teaching at level of understanding  - Provide teaching via preferred learning methods  Outcome: Progressing     Problem: CARDIOVASCULAR - ADULT  Goal: Absence of cardiac dysrhythmias or at baseline rhythm  Description: INTERVENTIONS:  - Continuous cardiac monitoring, vital signs, obtain 12 lead EKG if ordered  - Administer antiarrhythmic and heart rate control medications as ordered  - Monitor electrolytes and administer replacement therapy as ordered  Outcome: Progressing     Problem: METABOLIC, FLUID AND ELECTROLYTES - ADULT  Goal: Fluid balance maintained  Description: INTERVENTIONS:  - Monitor labs   - Monitor I/O and WT  - Instruct patient on fluid and nutrition as appropriate  - Assess for signs & symptoms of volume excess or deficit  Outcome: Progressing  Goal: Glucose maintained within target range  Description: INTERVENTIONS:  - Monitor Blood Glucose as ordered  - Assess for signs and symptoms of hyperglycemia and hypoglycemia  - Administer ordered medications to maintain glucose within target range  - Assess nutritional intake and initiate nutrition service referral as needed  Outcome: Progressing

## 2024-05-12 NOTE — PROGRESS NOTES
"Replaced by [facility]  Progress Note  Name: Brian Jones I  MRN: [id number]  Unit/Bed#: -01 I Date of Admission: 5/11/2024   Date of Service: 5/12/2024 I Hospital Day: 0    Assessment/Plan   * Syncope  Assessment & Plan  Reportedly had 3 episodes of passing out and shaking over the past 24 hours  EKG and that showed sinus bradycardia in the low 40s which subsequently improved  Patient has been on beta blocker since November; feeling sluggish  Improved with IV fluid hydration    Plan:  Monitor on telemetry- NSR to bradycardia  Troponin negative x 3  TSH-Normal  Glucose has been controlled here  Echo to r/o cardiac etiology- pending  Discontinue beta blocker      Hypomagnesemia  Assessment & Plan  1.5  Supplementation provider     Type 2 diabetes mellitus without complication, without long-term current use of insulin ([facility])  Assessment & Plan  No results found for: \"HGBA1C\"    Recent Labs     05/11/24  1614   POCGLU 124       Blood Sugar Average: Last 72 hrs:  (P) 124  Hold home regimen while inpatient and resume on discharge  Diabetic diet  Insulin regimen  Lantus 10 units HS  Glucose checks and Insulin correction ACHS  Goal -180 while admitted, adjusting insulin regimen as appropriate  Monitor for hypoglycemia and treat per protocol      Bradycardia  Assessment & Plan  Pulse: 66   Bradycardic on EKG initially which did improve  Reportedly taking Toprol XL at home  Hold BB at this time  Monitor HR    Class 2 severe obesity due to excess calories with serious comorbidity and body mass index (BMI) of 38.0 to 38.9 in adult ([facility])  Assessment & Plan  Body mass index is 38.78 kg/m².    Recommend incorporating a more whole foods plant-predominant diet along with decreasing consumption of red meats and processed foods  Per AHA guidelines, recommend moderate-vigorous intensity exercise for 30 minutes a day for 5 days a week or a total of 150 min/week             VTE Pharmacologic Prophylaxis: " VTE Score: 3 Moderate Risk (Score 3-4) - Pharmacological DVT Prophylaxis Ordered: enoxaparin (Lovenox).    Mobility:   Basic Mobility Inpatient Raw Score: 23  JH-HLM Goal: 7: Walk 25 feet or more  JH-HLM Achieved: 7: Walk 25 feet or more  JH-HLM Goal achieved. Continue to encourage appropriate mobility.    Patient Centered Rounds: I performed bedside rounds with nursing staff today. Iveth Fajardo  Discussions with Specialists or Other Care Team Provider: notes    Education and Discussions with Family / Patient: Patient declined call to .     Total Time Spent on Date of Encounter in care of patient: 35 mins. This time was spent on one or more of the following: performing physical exam; counseling and coordination of care; obtaining or reviewing history; documenting in the medical record; reviewing/ordering tests, medications or procedures; communicating with other healthcare professionals and discussing with patient's family/caregivers.    Current Length of Stay: 0 day(s)  Current Patient Status: Observation   Certification Statement: The patient will continue to require additional inpatient hospital stay due to echo  Discharge Plan: Anticipate discharge later today or tomorrow to home.    Code Status: Level 1 - Full Code    Subjective:    Pt denies any SOB, difficult breathing, chest pain or tightness. Pt denies any nausea, vomiting, diarrhea or difficulty eating.Feels much better today after stopping BB and getting IVF. States BB is new from November, halfed dose about 3-4 weeks ago and still feeling lousy, agreeable to dcing on discharge       Objective:     Vitals:   Temp (24hrs), Av.7 °F (36.5 °C), Min:97.2 °F (36.2 °C), Max:98.4 °F (36.9 °C)    Temp:  [97.2 °F (36.2 °C)-98.4 °F (36.9 °C)] 97.2 °F (36.2 °C)  HR:  [44-75] 66  Resp:  [16-22] 16  BP: (109-187)/(47-90) 141/61  SpO2:  [93 %-96 %] 93 %  Body mass index is 38.05 kg/m².     Input and Output Summary (last 24 hours):     Intake/Output  Summary (Last 24 hours) at 5/12/2024 1105  Last data filed at 5/12/2024 0731  Gross per 24 hour   Intake 1000 ml   Output 275 ml   Net 725 ml       Physical Exam:   Physical Exam  Vitals reviewed.   Cardiovascular:      Rate and Rhythm: Normal rate.   Abdominal:      General: Abdomen is protuberant.      Palpations: Abdomen is soft.      Comments:      Skin:     General: Skin is warm.      Coloration: Skin is pale.   Neurological:      Mental Status: He is alert. Mental status is at baseline.   Psychiatric:         Mood and Affect: Mood normal.          Additional Data:     Labs:  Results from last 7 days   Lab Units 05/12/24  0432   WBC Thousand/uL 7.09   HEMOGLOBIN g/dL 12.5   HEMATOCRIT % 38.6   PLATELETS Thousands/uL 144*   SEGS PCT % 57   LYMPHO PCT % 23   MONO PCT % 13*   EOS PCT % 6     Results from last 7 days   Lab Units 05/12/24  0432   SODIUM mmol/L 140   POTASSIUM mmol/L 4.6   CHLORIDE mmol/L 105   CO2 mmol/L 28   BUN mg/dL 16   CREATININE mg/dL 1.05   ANION GAP mmol/L 7   CALCIUM mg/dL 8.7   ALBUMIN g/dL 3.5   TOTAL BILIRUBIN mg/dL 0.81   ALK PHOS U/L 76   ALT U/L 50   AST U/L 33   GLUCOSE RANDOM mg/dL 118         Results from last 7 days   Lab Units 05/12/24  0623 05/11/24  2236 05/11/24  1614   POC GLUCOSE mg/dl 126 110 124               Lines/Drains:  Invasive Devices       Peripheral Intravenous Line  Duration             Peripheral IV 05/11/24 Left Antecubital <1 day                      Telemetry:  Telemetry Orders (From admission, onward)               24 Hour Telemetry Monitoring  Continuous x 24 Hours (Telem)        Question:  Reason for 24 Hour Telemetry  Answer:  Syncope suspected to be cardiac in origin                     Telemetry Reviewed: Normal Sinus Rhythm  Indication for Continued Telemetry Use: Arrthymias requiring medical therapy             Recent Cultures (last 7 days):         Last 24 Hours Medication List:   Current Facility-Administered Medications   Medication Dose Route  Frequency Provider Last Rate    acetaminophen  975 mg Oral Q6H PRN [facility], [facility]      aluminum-magnesium hydroxide-simethicone  30 mL Oral Q6H PRN [facility]      aspirin  81 mg Oral Daily [facility]      atorvastatin  10 mg Oral Daily With Dinner [facility], [facility]      docusate sodium  100 mg Oral BID [facility]      enoxaparin  40 mg Subcutaneous Daily [facility]      losartan  100 mg Oral Daily [facility]      And    hydroCHLOROthiazide  25 mg Oral Daily [facility], [facility]      insulin glargine  10 Units Subcutaneous HS [facility], DO      insulin lispro  2-12 Units Subcutaneous TID AC [facility], DO      insulin lispro  2-12 Units Subcutaneous HS [facility]      magnesium sulfate  4 g Intravenous Once CHRISTOPHER Diego 4 g (05/12/24 9922)    methenamine hippurate  1 g Oral BID Petra Pierre PA-C      ondansetron  4 mg Intravenous Q6H PRN [facility]      pantoprazole  40 mg Oral Early Morning [facility] Vinicius, DO          Today, Patient Was Seen By: CHRISTOPHER Diego    **Please Note: This note may have been constructed using a voice recognition system.**

## 2024-05-12 NOTE — PHYSICAL THERAPY NOTE
Physical Therapy Evaluation     Patient's Name: Brian Jones    Admitting Diagnosis  Bradycardia [R00.1]  Known medical problems [Z78.9]    Problem List  Patient Active Problem List   Diagnosis    Mass of soft tissue of right lower extremity    Syncope    Class 2 severe obesity due to excess calories with serious comorbidity and body mass index (BMI) of 38.0 to 38.9 in adult (HCC)    Bradycardia    Type 2 diabetes mellitus without complication, without long-term current use of insulin (HCC)    Hypomagnesemia     Past Medical History  History reviewed. No pertinent past medical history.    Past Surgical History  History reviewed. No pertinent surgical history.     05/12/24 0909   PT Last Visit   PT Visit Date 05/12/24   Note Type   Note type Evaluation   Pain Assessment   Pain Assessment Tool 0-10   Pain Score No Pain   Restrictions/Precautions   Weight Bearing Precautions Per Order No   Braces or Orthoses Other (Comment)  (none per patient)   Other Precautions Chair Alarm;Bed Alarm;Multiple lines;Telemetry;Fall Risk   Home Living   Type of Home House   Home Layout Two level;Bed/bath upstairs;Stairs to enter with rails  (6 EMMANUEL; 7 inside steps to 2nd floor)   Bathroom Shower/Tub Walk-in shower   Bathroom Toilet Standard   Bathroom Equipment Shower chair   Bathroom Accessibility Accessible   Home Equipment Other (Comment)  (none per patient)   Additional Comments Pt ambulates without an AD.   Prior Function   Level of [place] Independent with functional mobility;Independent with ADLs;Independent with IADLS   Lives With Spouse;Family   Receives Help From Family   IADLs Independent with driving;Independent with meal prep;Independent with medication management   Falls in the last 6 months 1 to 4  (1 fall; 4 syncopal episodes)   Vocational Retired   General   Family/Caregiver Present No   Cognition   Overall Cognitive Status WFL   Arousal/Participation Alert   Orientation Level Oriented X4   Memory Within  "functional limits   Following Commands Follows all commands and directions without difficulty   Comments Pt agreeable to PT.   Subjective   Subjective \"I haven't been up yet, but I'm feeling weak.\"   RLE Assessment   RLE Assessment X   Strength RLE   RLE Overall Strength 4-/5   LLE Assessment   LLE Assessment X   Strength LLE   LLE Overall Strength 4-/5   Light Touch   RLE Light Touch Grossly intact   LLE Light Touch Grossly intact   Bed Mobility   Supine to Sit 5  Supervision   Additional items Assist x 1;HOB elevated;Bedrails;Verbal cues   Additional Comments Pt reported mild dizziness upon sitting at EOB; resolved with a seated rest break.   Transfers   Sit to Stand 5  Supervision   Additional items Assist x 1;Increased time required;Verbal cues   Stand to Sit 5  Supervision   Additional items Assist x 1;Increased time required;Verbal cues   Ambulation/Elevation   Gait pattern Short stride;Decreased hip extension;Decreased heel strike;Inconsistent dot   Gait Assistance   (CG assist)   Additional items Assist x 1;Verbal cues   Assistive Device None   Distance 150 feet   Stair Management Assistance   (CG assist)   Additional items Assist x 1;Verbal cues   Stair Management Technique One rail R;Alternating pattern   Number of Stairs   (pt performed alternating high marching in place x 2 trials)   Ambulation/Elevation Additional Comments Pt denied complaints of dizziness while ambulating.   Balance   Static Sitting Good   Dynamic Sitting Fair +   Static Standing Fair   Dynamic Standing Fair -   Ambulatory Fair -   Endurance Deficit   Endurance Deficit Yes   Endurance Deficit Description decreased activity tolerance   Activity Tolerance   Activity Tolerance Patient tolerated treatment well   Medical Staff Made Aware OT Heather  (Co-evaluation performed with OT secondary to complex medical condition of patient and regression of functional status from baseline. PT/OT goals were addressed separately.)   Nurse Made Aware " nursing staff made aware   Assessment   Prognosis Good   Problem List Decreased strength;Decreased endurance;Impaired balance;Decreased mobility   Assessment Pt is 83 year old male seen for PT evaluation s/p admit to [facility] on 5/11/2024 with Syncope. PT consulted to assess pt's functional mobility and discharge needs. Order placed for PT evaluation and treatment, with up and out of bed as tolerated order. Comorbidities affecting pt's physical performance at time of assessment include class 2 severe obesity, bradycardia, type 2 DM, and hypomagnesemia. Prior to hospitalization, pt was independent with all functional mobility without an AD. Pt ambulates unrestricted distances on all terrain and elevations. Pt resides with his spouse, in a two level house with six steps to enter. Personal factors affecting pt at time of initial evaluation include lives in a two story house, stairs to enter home, difficulty ambulating community distances, difficulty navigating level surfaces without external assistance, difficulty performing dynamic tasks in the community, positive fall history, and difficulty performing ADLs and IADLs. Please find objective findings from PT assessment regarding body systems outlined above with impairments and limitations including weakness, impaired balance, decreased endurance, gait deviations, decreased activity tolerance, decreased functional mobility tolerance, and fall risk. The following objective measures were performed on initial evaluation Barthel Index: 75/100, Modified [place]: 3 (moderate disability), and AM-PAC 6-Clicks: 19/24. Pt's clinical presentation is currently evolving seen in pt's presentation of need for ongoing medical management/monitoring, pt is a fall risk, and pt requires cues and assist for safety with functional mobility. Pt to benefit from continued PT treatment to address deficits as defined above and maximize pt's level of function and independence with  mobility. From a PT standpoint, recommendation at time of discharge would be level 3, minimal resource intensity in order to facilitate return to prior level of function.   Barriers to Discharge None   Goals   STG Expiration Date 05/22/24   Short Term Goal #1 In 10 days: Increase bilateral LE strength 1/2 grade to facilitate independent mobility, Perform all bed mobility tasks modified independent to decrease caregiver burden, Perform all transfers modified independent to improve independence, Ambulate > 250 ft. with least restrictive assistive device modified independent w/o LOB and w/ normalized gait pattern 100% of the time, Navigate 6+7 stairs modified independent with unilateral handrail to facilitate return to previous living environment, and Increase all balance 1/2 grade to decrease risk for falls   Plan   Treatment/Interventions Functional transfer training;LE strengthening/ROM;Elevations;Therapeutic exercise;Endurance training;Patient/family training;Bed mobility;Gait training;Spoke to nursing;OT   PT Frequency 1-2x/wk   Discharge Recommendation   Rehab Resource Intensity Level, PT III (Minimum Resource Intensity)   AM-PAC Basic Mobility Inpatient   Turning in Flat Bed Without Bedrails 4   Lying on Back to Sitting on Edge of Flat Bed Without Bedrails 3   Moving Bed to Chair 3   Standing Up From Chair Using Arms 3   Walk in Room 3   Climb 3-5 Stairs With Railing 3   Basic Mobility Inpatient Raw Score 19   Basic Mobility Standardized Score 42.48   [facility] Highest Level Of Mobility   -HLM Goal 6: Walk 10 steps or more   -HLM Achieved 7: Walk 25 feet or more   Modified [place] Scale   Modified [place] Scale 3   Barthel Index   Feeding 10   Bathing 0   Grooming Score 5   Dressing Score 10   Bladder Score 10   Bowels Score 10   Toilet Use Score 5   Transfers (Bed/Chair) Score 10   Mobility (Level Surface) Score 10   Stairs Score 5   Barthel Index Score 75     PT Evaluation Time: 0848-0909  Rhiannon Almanza  PT, DPT

## 2024-05-12 NOTE — PLAN OF CARE
Problem: CARDIOVASCULAR - ADULT  Goal: Absence of cardiac dysrhythmias or at baseline rhythm  Description: INTERVENTIONS:  - Continuous cardiac monitoring, vital signs, obtain 12 lead EKG if ordered  - Administer antiarrhythmic and heart rate control medications as ordered  - Monitor electrolytes and administer replacement therapy as ordered  Outcome: Progressing     Problem: Knowledge Deficit  Goal: Patient/family/caregiver demonstrates understanding of disease process, treatment plan, medications, and discharge instructions  Description: Complete learning assessment and assess knowledge base.  Interventions:  - Provide teaching at level of understanding  - Provide teaching via preferred learning methods  Outcome: Progressing

## 2024-05-13 ENCOUNTER — APPOINTMENT (INPATIENT)
Dept: NON INVASIVE DIAGNOSTICS | Facility: HOSPITAL | Age: 84
DRG: 310 | End: 2024-05-13
Payer: MEDICARE

## 2024-05-13 VITALS
DIASTOLIC BLOOD PRESSURE: 76 MMHG | SYSTOLIC BLOOD PRESSURE: 131 MMHG | TEMPERATURE: 97.7 F | HEIGHT: 70 IN | RESPIRATION RATE: 18 BRPM | HEART RATE: 73 BPM | BODY MASS INDEX: 37.94 KG/M2 | WEIGHT: 265 LBS | OXYGEN SATURATION: 93 %

## 2024-05-13 PROBLEM — E83.42 HYPOMAGNESEMIA: Status: RESOLVED | Noted: 2024-05-12 | Resolved: 2024-05-13

## 2024-05-13 LAB
ANION GAP SERPL CALCULATED.3IONS-SCNC: 5 MMOL/L (ref 4–13)
AORTIC ROOT: 3.4 CM
AORTIC VALVE MEAN VELOCITY: 16.1 M/S
APICAL FOUR CHAMBER EJECTION FRACTION: 65 %
ASCENDING AORTA: 2.7 CM
AV AREA BY CONTINUOUS VTI: 1.3 CM2
AV AREA PEAK VELOCITY: 1.3 CM2
AV LVOT MEAN GRADIENT: 2 MMHG
AV LVOT PEAK GRADIENT: 3 MMHG
AV MEAN GRADIENT: 11 MMHG
AV PEAK GRADIENT: 19 MMHG
AV REGURGITATION PRESSURE HALF TIME: 354 MS
AV VALVE AREA: 1.33 CM2
AV VELOCITY RATIO: 0.42
BSA FOR ECHO PROCEDURE: 2.35 M2
BUN SERPL-MCNC: 17 MG/DL (ref 5–25)
CALCIUM SERPL-MCNC: 8.9 MG/DL (ref 8.4–10.2)
CHLORIDE SERPL-SCNC: 105 MMOL/L (ref 96–108)
CO2 SERPL-SCNC: 27 MMOL/L (ref 21–32)
CREAT SERPL-MCNC: 1.08 MG/DL (ref 0.6–1.3)
DOP CALC AO PEAK VEL: 2.2 M/S
DOP CALC AO VTI: 49.29 CM
DOP CALC LVOT AREA: 3.14 CM2
DOP CALC LVOT CARDIAC INDEX: 1.68 L/MIN/M2
DOP CALC LVOT CARDIAC OUTPUT: 3.94 L/MIN
DOP CALC LVOT DIAMETER: 2 CM
DOP CALC LVOT PEAK VEL VTI: 20.82 CM
DOP CALC LVOT PEAK VEL: 0.93 M/S
DOP CALC LVOT STROKE INDEX: 26.4 ML/M2
DOP CALC LVOT STROKE VOLUME: 65.37
E WAVE DECELERATION TIME: 279 MS
E/A RATIO: 0.62
ERYTHROCYTE [DISTWIDTH] IN BLOOD BY AUTOMATED COUNT: 12.7 % (ref 11.6–15.1)
FRACTIONAL SHORTENING: 33 (ref 28–44)
GFR SERPL CREATININE-BSD FRML MDRD: 64 ML/MIN/1.73SQ M
GLUCOSE SERPL-MCNC: 116 MG/DL (ref 65–140)
GLUCOSE SERPL-MCNC: 120 MG/DL (ref 65–140)
GLUCOSE SERPL-MCNC: 123 MG/DL (ref 65–140)
GLUCOSE SERPL-MCNC: 159 MG/DL (ref 65–140)
HCT VFR BLD AUTO: 39.1 % (ref 36.5–49.3)
HGB BLD-MCNC: 12.6 G/DL (ref 12–17)
INTERVENTRICULAR SEPTUM IN DIASTOLE (PARASTERNAL SHORT AXIS VIEW): 1.1 CM
INTERVENTRICULAR SEPTUM: 1.1 CM (ref 0.6–1.1)
LAAS-AP2: 23.6 CM2
LAAS-AP4: 20.3 CM2
LEFT ATRIUM AREA SYSTOLE SINGLE PLANE A4C: 21.7 CM2
LEFT ATRIUM SIZE: 4.2 CM
LEFT ATRIUM VOLUME (MOD BIPLANE): 68 ML
LEFT ATRIUM VOLUME INDEX (MOD BIPLANE): 28.9 ML/M2
LEFT INTERNAL DIMENSION IN SYSTOLE: 3.3 CM (ref 2.1–4)
LEFT VENTRICULAR INTERNAL DIMENSION IN DIASTOLE: 4.9 CM (ref 3.5–6)
LEFT VENTRICULAR POSTERIOR WALL IN END DIASTOLE: 1.1 CM
LEFT VENTRICULAR STROKE VOLUME: 69 ML
LVSV (TEICH): 69 ML
MAGNESIUM SERPL-MCNC: 2 MG/DL (ref 1.9–2.7)
MCH RBC QN AUTO: 33.2 PG (ref 26.8–34.3)
MCHC RBC AUTO-ENTMCNC: 32.2 G/DL (ref 31.4–37.4)
MCV RBC AUTO: 103 FL (ref 82–98)
MV E'TISSUE VEL-LAT: 8 CM/S
MV E'TISSUE VEL-SEP: 5 CM/S
MV PEAK A VEL: 0.87 M/S
MV PEAK E VEL: 54 CM/S
MV STENOSIS PRESSURE HALF TIME: 81 MS
MV VALVE AREA P 1/2 METHOD: 2.72
PLATELET # BLD AUTO: 148 THOUSANDS/UL (ref 149–390)
PMV BLD AUTO: 11 FL (ref 8.9–12.7)
POTASSIUM SERPL-SCNC: 4.4 MMOL/L (ref 3.5–5.3)
RBC # BLD AUTO: 3.79 MILLION/UL (ref 3.88–5.62)
RIGHT ATRIUM AREA SYSTOLE A4C: 14.7 CM2
RIGHT VENTRICLE ID DIMENSION: 3.9 CM
SL CV AV DECELERATION TIME RETROGRADE: 1219 MS
SL CV AV PEAK GRADIENT RETROGRADE: 57 MMHG
SL CV LEFT ATRIUM LENGTH A2C: 5.8 CM
SL CV LV EF: 60
SL CV PED ECHO LEFT VENTRICLE DIASTOLIC VOLUME (MOD BIPLANE) 2D: 113 ML
SL CV PED ECHO LEFT VENTRICLE SYSTOLIC VOLUME (MOD BIPLANE) 2D: 44 ML
SODIUM SERPL-SCNC: 137 MMOL/L (ref 135–147)
TRICUSPID ANNULAR PLANE SYSTOLIC EXCURSION: 2.3 CM
WBC # BLD AUTO: 7.46 THOUSAND/UL (ref 4.31–10.16)

## 2024-05-13 PROCEDURE — 93306 TTE W/DOPPLER COMPLETE: CPT

## 2024-05-13 PROCEDURE — 93306 TTE W/DOPPLER COMPLETE: CPT | Performed by: INTERNAL MEDICINE

## 2024-05-13 PROCEDURE — 85027 COMPLETE CBC AUTOMATED: CPT | Performed by: NURSE PRACTITIONER

## 2024-05-13 PROCEDURE — 82948 REAGENT STRIP/BLOOD GLUCOSE: CPT

## 2024-05-13 PROCEDURE — 83735 ASSAY OF MAGNESIUM: CPT | Performed by: NURSE PRACTITIONER

## 2024-05-13 PROCEDURE — NC001 PR NO CHARGE: Performed by: PHYSICIAN ASSISTANT

## 2024-05-13 PROCEDURE — 80048 BASIC METABOLIC PNL TOTAL CA: CPT | Performed by: NURSE PRACTITIONER

## 2024-05-13 PROCEDURE — 99239 HOSP IP/OBS DSCHRG MGMT >30: CPT | Performed by: PHYSICIAN ASSISTANT

## 2024-05-13 RX ADMIN — DOCUSATE SODIUM 100 MG: 100 CAPSULE, LIQUID FILLED ORAL at 08:50

## 2024-05-13 RX ADMIN — LOSARTAN POTASSIUM 100 MG: 50 TABLET, FILM COATED ORAL at 08:50

## 2024-05-13 RX ADMIN — ENOXAPARIN SODIUM 40 MG: 40 INJECTION SUBCUTANEOUS at 08:51

## 2024-05-13 RX ADMIN — PANTOPRAZOLE SODIUM 40 MG: 40 TABLET, DELAYED RELEASE ORAL at 05:20

## 2024-05-13 RX ADMIN — HYDROCHLOROTHIAZIDE 25 MG: 25 TABLET ORAL at 08:51

## 2024-05-13 RX ADMIN — METHENAMINE HIPPURATE 1 G: 1 TABLET ORAL at 08:52

## 2024-05-13 RX ADMIN — INSULIN LISPRO 2 UNITS: 100 INJECTION, SOLUTION INTRAVENOUS; SUBCUTANEOUS at 12:04

## 2024-05-13 RX ADMIN — ASPIRIN 81 MG: 81 TABLET, COATED ORAL at 08:50

## 2024-05-13 NOTE — PROGRESS NOTES
[facility]  Progress Note  Name: Brian Jones I MRN: [id number]  Unit/Bed#: MO ECHOI Date of Admission: 5/11/2024   Date of Service: 5/11/2024  I Hospital Day: 1    * Syncope  Assessment & Plan  Reportedly had 3 episodes of passing out and shaking over the 24 hours leading up to hospitalization   EKG - sinus bradycardia in the low 40s which subsequently improved  Patient has been on beta blocker since November; feeling sluggish  Improved with IV fluid hydration    Plan:  Monitor on telemetry - NSR to bradycardia  Troponin negative x 3  TSH - Normal  Glucose has been controlled here  Echo to r/o cardiac etiology- pending  Discontinue beta blocker      Bradycardia  Assessment & Plan  Pulse: (!) 51   Bradycardic on EKG initially which did improve  Reportedly taking Toprol XL at home  Hold BB at this time  Monitor HR    Type 2 diabetes mellitus without complication, without long-term current use of insulin ([facility])  Assessment & Plan  Blood Sugar Average: Last 72 hrs:(P) 129.8153332998567134  A1c 7.7, could be better controlled  Hold metformin  Diabetic diet  Insulin regimen  Lantus 10 units HS  Glucose checks and Insulin correction ACHS  Goal -180 while admitted, adjusting insulin regimen as appropriate  Monitor for hypoglycemia and treat per protocol      Class 2 severe obesity due to excess calories with serious comorbidity and body mass index (BMI) of 38.0 to 38.9 in adult ([facility])  Assessment & Plan  Body mass index is 38.05 kg/m².  Recommend incorporating a more whole foods plant-predominant diet along with decreasing consumption of red meats and processed foods  Per AHA guidelines, recommend moderate-vigorous intensity exercise for 30 minutes a day for 5 days a week or a total of 150 min/week      Hypomagnesemia-resolved as of 5/13/2024  Assessment & Plan  1.5, s/p supplementation and now wnl 2.0           VTE Pharmacologic Prophylaxis: VTE Score: 3 Moderate Risk (Score 3-4) -  Pharmacological DVT Prophylaxis Ordered: enoxaparin (Lovenox).    Mobility:   Basic Mobility Inpatient Raw Score: 19  JH-HLM Goal: 6: Walk 10 steps or more  JH-HLM Achieved: 6: Walk 10 steps or more  JH-HLM Goal achieved. Continue to encourage appropriate mobility.    Patient Centered Rounds: I performed bedside rounds with nursing staff today.   Discussions with Specialists or Other Care Team Provider:     Education and Discussions with Family / Patient: Updated  (wife) at bedside.    Total Time Spent on Date of Encounter in care of patient: 40 mins. This time was spent on one or more of the following: performing physical exam; counseling and coordination of care; obtaining or reviewing history; documenting in the medical record; reviewing/ordering tests, medications or procedures; communicating with other healthcare professionals and discussing with patient's family/caregivers.    Current Length of Stay: 1 day(s)  Current Patient Status: Inpatient   Certification Statement: The patient will continue to require additional inpatient hospital stay due to awaiting echocardiogram; if normal, will discuss with cardiology inpatient vs outpatient consultation for persistent bradycardia   Discharge Plan: Anticipate discharge later today or tomorrow to home.    Code Status: Level 1 - Full Code    Subjective:   Doing well, still with bradycardia, but denies dizziness or chest pain, no palpitations or shortness of breath. He reports that he knew the BB was the culprit and he just felt terrible from the time it was started, even with lowering the dosing.     Objective:     Vitals:   Temp (24hrs), Av.7 °F (36.5 °C), Min:97.4 °F (36.3 °C), Max:98.1 °F (36.7 °C)    Temp:  [97.4 °F (36.3 °C)-98.1 °F (36.7 °C)] 98.1 °F (36.7 °C)  HR:  [51-73] 51  Resp:  [17-18] 18  BP: (128-182)/(58-71) 156/62  SpO2:  [93 %-94 %] 93 %  Body mass index is 38.05 kg/m².     Input and Output Summary (last 24 hours):     Intake/Output  Summary (Last 24 hours) at 5/13/2024 0916  Last data filed at 5/13/2024 0447  Gross per 24 hour   Intake 780 ml   Output 700 ml   Net 80 ml       Physical Exam:   Physical Exam  Vitals and nursing note reviewed.   Constitutional:       General: He is awake. He is not in acute distress.     Appearance: He is overweight. He is not ill-appearing or toxic-appearing.   Cardiovascular:      Rate and Rhythm: Regular rhythm. Bradycardia present.   Pulmonary:      Effort: Pulmonary effort is normal. No respiratory distress.      Breath sounds: No wheezing.   Abdominal:      General: There is no distension.   Musculoskeletal:      Right lower leg: No edema.      Left lower leg: No edema.   Neurological:      Mental Status: He is alert and oriented to person, place, and time.   Psychiatric:         Mood and Affect: Mood normal.         Behavior: Behavior normal. Behavior is cooperative.         Additional Data:     Labs:  Results from last 7 days   Lab Units 05/13/24  0446 05/12/24  0432   WBC Thousand/uL 7.46 7.09   HEMOGLOBIN g/dL 12.6 12.5   HEMATOCRIT % 39.1 38.6   PLATELETS Thousands/uL 148* 144*   SEGS PCT %  --  57   LYMPHO PCT %  --  23   MONO PCT %  --  13*   EOS PCT %  --  6     Results from last 7 days   Lab Units 05/13/24  0446 05/12/24  0432   SODIUM mmol/L 137 140   POTASSIUM mmol/L 4.4 4.6   CHLORIDE mmol/L 105 105   CO2 mmol/L 27 28   BUN mg/dL 17 16   CREATININE mg/dL 1.08 1.05   ANION GAP mmol/L 5 7   CALCIUM mg/dL 8.9 8.7   ALBUMIN g/dL  --  3.5   TOTAL BILIRUBIN mg/dL  --  0.81   ALK PHOS U/L  --  76   ALT U/L  --  50   AST U/L  --  33   GLUCOSE RANDOM mg/dL 116 118         Results from last 7 days   Lab Units 05/13/24  0626 05/12/24  2111 05/12/24  1532 05/12/24  1116 05/12/24  0623 05/11/24  2236 05/11/24  1614   POC GLUCOSE mg/dl 123 99 159* 168* 126 110 124     Results from last 7 days   Lab Units 05/11/24  1718   HEMOGLOBIN A1C % 7.7*           Lines/Drains:  Invasive Devices       Peripheral  Intravenous Line  Duration             Peripheral IV 05/11/24 Left Antecubital 1 day                      Telemetry:  Telemetry Orders (From admission, onward)               24 Hour Telemetry Monitoring  Continuous x 24 Hours (Telem)        Question:  Reason for 24 Hour Telemetry  Answer:  Syncope suspected to be cardiac in origin                     Telemetry Reviewed: Sinus Bradycardia  Indication for Continued Telemetry Use: Arrthymias requiring medical therapy             Imaging: No pertinent imaging reviewed.    Recent Cultures (last 7 days):         Last 24 Hours Medication List:   Current Facility-Administered Medications   Medication Dose Route Frequency Provider Last Rate    acetaminophen  975 mg Oral Q6H PRN [facility] Parameswaran, DO      aluminum-magnesium hydroxide-simethicone  30 mL Oral Q6H PRN [facility] Parameswaran, DO      aspirin  81 mg Oral Daily [facility]      atorvastatin  10 mg Oral Daily With Dinner [facility] Parammirandaan, DO      docusate sodium  100 mg Oral BID [facility]      enoxaparin  40 mg Subcutaneous Daily [facility], DO      losartan  100 mg Oral Daily [facility], DO      And    hydroCHLOROthiazide  25 mg Oral Daily [facility], DO      insulin glargine  10 Units Subcutaneous HS [facility], DO      insulin lispro  2-12 Units Subcutaneous TID AC [facility], DO      insulin lispro  2-12 Units Subcutaneous HS [facility], DO      methenamine hippurate  1 g Oral BID Petra Pierre PA-C      ondansetron  4 mg Intravenous Q6H PRN [facility] Parameswaran, DO      pantoprazole  40 mg Oral Early Morning [facility] Freddyligia, DO          Today, Patient Was Seen By: Suzy Wagner PA-C    **Please Note: This note may have been constructed using a voice recognition system.**

## 2024-05-13 NOTE — ASSESSMENT & PLAN NOTE
"No results found for: \"HGBA1C\"    Recent Labs     05/11/24  1614   POCGLU 124       Blood Sugar Average: Last 72 hrs:  (P) 124  Hold home regimen while inpatient and resume on discharge  Diabetic diet  Insulin regimen  Lantus 10 units HS  Glucose checks and Insulin correction ACHS  Goal -180 while admitted, adjusting insulin regimen as appropriate  Monitor for hypoglycemia and treat per protocol    "
1.5  Supplementation provider   
1.5, s/p supplementation and now wnl 2.0   
Blood Sugar Average: Last 72 hrs:(P) [id number]  A1c 7.7, could be better controlled  Resume  metformin  Diabetic diet  
Body mass index is 38.05 kg/m².  Recommend incorporating a more whole foods plant-predominant diet along with decreasing consumption of red meats and processed foods  Per AHA guidelines, recommend moderate-vigorous intensity exercise for 30 minutes a day for 5 days a week or a total of 150 min/week    
Body mass index is 38.78 kg/m².    Recommend incorporating a more whole foods plant-predominant diet along with decreasing consumption of red meats and processed foods  Per AHA guidelines, recommend moderate-vigorous intensity exercise for 30 minutes a day for 5 days a week or a total of 150 min/week    
Pulse: (!) 51   Bradycardic on EKG initially which did improve  Reportedly taking Toprol XL at home  Hold BB at this time  Instructed to discontinue metoprolol all together   
Pulse: 66   Bradycardic on EKG initially which did improve  Reportedly taking Toprol XL at home  Hold BB at this time  Monitor HR  
Reportedly had 3 episodes of passing out and shaking over the 24 hours leading up to hospitalization   EKG - sinus bradycardia in the low 40s which subsequently improved  Patient has been on beta blocker since November; feeling sluggish  Improved with IV fluid hydration    Plan:  Monitor on telemetry - NSR to bradycardia  Troponin negative x 3  TSH - Normal  Glucose has been controlled here  Echo to r/o structural cardiac etiology is reassuring  Discontinue beta blocker    
Reportedly had 3 episodes of passing out and shaking over the past 24 hours  EKG and that showed sinus bradycardia in the low 40s which subsequently improved  Patient has been on beta blocker since November; feeling sluggish  Improved with IV fluid hydration    Plan:  Monitor on telemetry- NSR to bradycardia  Troponin negative x 3  TSH-Normal  Glucose has been controlled here  Echo to r/o cardiac etiology- pending  Discontinue beta blocker    
no

## 2024-05-13 NOTE — NURSING NOTE
IV and Leia removed from patient. AVS printed and reviewed, patient offered no questions at this time.

## 2024-05-13 NOTE — DISCHARGE INSTR - AVS FIRST PAGE
Stop taking metoprolol  Monitor symptoms at home  We are referring you to cardiology for further evaluation and cardiac event monitor  Return to ER for any chest pain, syncope, confusion, etc.   Avoid strenuous activity, stay well hydrated, avoid alcohol

## 2024-05-13 NOTE — DISCHARGE SUMMARY
[facility]  Discharge Summary  Name: Brian Jones I MRN: [id number]  Unit/Bed#: MO ECHOI Date of Admission: 5/11/2024   Date of Service: 5/11/2024  I Hospital Day: 1    * Syncope  Assessment & Plan  Reportedly had 3 episodes of passing out and shaking over the 24 hours leading up to hospitalization   EKG - sinus bradycardia in the low 40s which subsequently improved  Patient has been on beta blocker since November; feeling sluggish  Improved with IV fluid hydration    Plan:  Monitor on telemetry - NSR to bradycardia  Troponin negative x 3  TSH - Normal  Glucose has been controlled here  Echo to r/o structural cardiac etiology is reassuring  Discontinue beta blocker      Bradycardia  Assessment & Plan  Pulse: (!) 51   Bradycardic on EKG initially which did improve  Reportedly taking Toprol XL at home  Hold BB at this time  Instructed to discontinue metoprolol all together     Type 2 diabetes mellitus without complication, without long-term current use of insulin ([facility])  Assessment & Plan  Blood Sugar Average: Last 72 hrs:(P) [id number]  A1c 7.7, could be better controlled  Resume  metformin  Diabetic diet    Class 2 severe obesity due to excess calories with serious comorbidity and body mass index (BMI) of 38.0 to 38.9 in adult ([facility])  Assessment & Plan  Body mass index is 38.05 kg/m².  Recommend incorporating a more whole foods plant-predominant diet along with decreasing consumption of red meats and processed foods  Per AHA guidelines, recommend moderate-vigorous intensity exercise for 30 minutes a day for 5 days a week or a total of 150 min/week      Hypomagnesemia-resolved as of 5/13/2024  Assessment & Plan  1.5, s/p supplementation and now wnl 2.0          Medical Problems       Resolved Problems  Date Reviewed: 5/13/2024            Resolved    Hypomagnesemia 5/13/2024     Resolved by  Suzy Wagner PA-C        Discharging Physician / Practitioner: Suzy Wagner  "LAMIN  PCP: [facility]  Admission Date:   Admission Orders (From admission, onward)       Ordered        05/12/24 1803  INPATIENT ADMISSION  Once            05/11/24 2135  Place in Observation  Once                          Discharge Date: 05/13/24    Consultations During Hospital Stay:  None    Procedures Performed:   None     Significant Findings / Test Results:   XR chest 1 view portable 5/12/2024  No acute cardiopulmonary disease. Workstation performed: [id number]     Echo 5/13/2024  LVEF 60%, systolic function normal, grade 1 diastolic dysfunction, left atrial dilation, mild aortic stenosis    Incidental Findings:   No      Test Results Pending at Discharge (will require follow up):        Outpatient Tests Requested:  Cardiology referral  Cardiac monitor     Complications:  no    Reason for Admission: syncope     Hospital Course:   Brian Jones is a 83 y.o. male patient who originally presented to the hospital on 5/11/2024 due to \"checking out\" episodes over the last several weeks, where he has a 5-second warning and then things go hazy for 20 seconds before coming back.     Suspect bradycardia and possible orthostasis the culprit. Workup did reveal significant bradycardia which has improved with cessation of beta blocker. He is feeling better.     Would recommend cardiology follow up outpatient and event recorder. If continues to be bradycardic without AV skye blockers, would consider EP evaluation. All questions answered to the best of my ability at this time to patient satisfaction. Plan of care agreed upon by all.       Please see above list of diagnoses and related plan for additional information.     Condition at Discharge: fair    Discharge Day Visit / Exam:   * Please refer to separate progress note for these details *    Discussion with Family: Updated  (wife) at bedside.    Discharge instructions/Information to patient and family:   See after visit summary for information provided " to patient and family.      Provisions for Follow-Up Care:  See after visit summary for information related to follow-up care and any pertinent home health orders.      Mobility at time of Discharge:   Basic Mobility Inpatient Raw Score: 19  JH-HLM Goal: 6: Walk 10 steps or more  JH-HLM Achieved: 6: Walk 10 steps or more  HLM Goal achieved. Continue to encourage appropriate mobility.     Disposition:   Home    Planned Readmission: no     Discharge Statement:  I spent 35 minutes discharging the patient. This time was spent on the day of discharge. I had direct contact with the patient on the day of discharge. Greater than 50% of the total time was spent examining patient, answering all patient questions, arranging and discussing plan of care with patient as well as directly providing post-discharge instructions.  Additional time then spent on discharge activities.    Discharge Medications:  See after visit summary for reconciled discharge medications provided to patient and/or family.      **Please Note: This note may have been constructed using a voice recognition system**

## 2024-05-13 NOTE — PLAN OF CARE
Problem: CARDIOVASCULAR - ADULT  Goal: Absence of cardiac dysrhythmias or at baseline rhythm  Description: INTERVENTIONS:  - Continuous cardiac monitoring, vital signs, obtain 12 lead EKG if ordered  - Administer antiarrhythmic and heart rate control medications as ordered  - Monitor electrolytes and administer replacement therapy as ordered  Outcome: Progressing     Problem: METABOLIC, FLUID AND ELECTROLYTES - ADULT  Goal: Fluid balance maintained  Description: INTERVENTIONS:  - Monitor labs   - Monitor I/O and WT  - Instruct patient on fluid and nutrition as appropriate  - Assess for signs & symptoms of volume excess or deficit  Outcome: Progressing  Goal: Glucose maintained within target range  Description: INTERVENTIONS:  - Monitor Blood Glucose as ordered  - Assess for signs and symptoms of hyperglycemia and hypoglycemia  - Administer ordered medications to maintain glucose within target range  - Assess nutritional intake and initiate nutrition service referral as needed  Outcome: Progressing

## 2024-05-17 NOTE — ED PROVIDER NOTES
"History  Chief Complaint   Patient presents with    Medical Problem     Pt c/o several episodes over the past 2 weeks where \"I check out, I have a 5 second warning and then things go hazy and wavy for about 20 seconds and then I come back to\"      83-year-old male presents emergency department for evaluation of syncope.  Patient reports several episodes over the past week of \"checking out\" which she means that he loses consciousness or nearly lose consciousness.  Patient states that he has about a 5-second prodrome of feeling lightheaded or dizzy before this happens.  He has had no chest pain palpitations or shortness of breath otherwise.        Prior to Admission Medications   Prescriptions Last Dose Informant Patient Reported? Taking?   Fish Oil Triglycerides 10 GM/100ML EMUL 2024 Self Yes Yes   Metformin HCl (RIOMET) 500 MG/5ML oral solution 2024 Self Yes Yes   Misc Natural Products (Tart [place] Advanced) CAPS 2024 Self Yes Yes   Probiotic Product (PROBIOTIC-10 PO) 2024 Self Yes Yes   aspirin (ECOTRIN LOW STRENGTH) 81 mg EC tablet 2024 Self Yes Yes   atorvastatin (LIPITOR) 10 mg tablet 2024 Self Yes Yes   meloxicam (MOBIC) 15 mg tablet 2024 Spouse/Significant Other, Self Yes Yes   Sig: Take 15 mg by mouth daily   methenamine hippurate (HIPREX) 1 g tablet 2024 Self Yes Yes   olmesartan-hydrochlorothiazide (BENICAR HCT) 40-25 MG per tablet 2024 Self Yes Yes   Si tab(s)   omeprazole (PriLOSEC) 20 mg delayed release capsule 2024 Self Yes Yes      Facility-Administered Medications: None       History reviewed. No pertinent past medical history.    History reviewed. No pertinent surgical history.    Family History   Problem Relation Age of Onset    Breast cancer additional onset Mother     No Known Problems Father      I have reviewed and agree with the history as documented.    E-Cigarette/Vaping    E-Cigarette Use Former User      E-Cigarette/Vaping Substances    " Nicotine No     THC No     CBD No     Flavoring No     Other No     Unknown No      Social History     Tobacco Use    Smoking status: Former     Current packs/day: 0.00     Average packs/day: 1.5 packs/day for 40.0 years (60.0 ttl pk-yrs)     Types: Cigarettes     Start date:      Quit date:      Years since quittin.4     Passive exposure: Past    Smokeless tobacco: Never   Vaping Use    Vaping status: Former   Substance Use Topics    Alcohol use: Never    Drug use: Never       Review of Systems   Constitutional:  Negative for appetite change, chills, fatigue and fever.   HENT:  Negative for sneezing and sore throat.    Eyes:  Negative for visual disturbance.   Respiratory:  Negative for cough, choking, chest tightness, shortness of breath and wheezing.    Cardiovascular:  Negative for chest pain and palpitations.   Gastrointestinal:  Negative for abdominal pain, constipation, diarrhea, nausea and vomiting.   Genitourinary:  Negative for difficulty urinating and dysuria.   Neurological:  Positive for syncope. Negative for dizziness, weakness, light-headedness, numbness and headaches.   All other systems reviewed and are negative.      Physical Exam  Physical Exam  Vitals and nursing note reviewed.   Constitutional:       General: He is not in acute distress.     Appearance: He is well-developed. He is not diaphoretic.   HENT:      Head: Normocephalic and atraumatic.   Eyes:      Pupils: Pupils are equal, round, and reactive to light.   Neck:      Vascular: No JVD.      Trachea: No tracheal deviation.   Cardiovascular:      Rate and Rhythm: Regular rhythm. Bradycardia present.      Heart sounds: Normal heart sounds. No murmur heard.     No friction rub. No gallop.   Pulmonary:      Effort: Pulmonary effort is normal. No respiratory distress.      Breath sounds: Normal breath sounds. No wheezing or rales.   Abdominal:      General: Bowel sounds are normal. There is no distension.      Palpations: Abdomen  is soft.      Tenderness: There is no abdominal tenderness. There is no guarding or rebound.   Skin:     General: Skin is warm and dry.      Coloration: Skin is not pale.   Neurological:      Mental Status: He is alert and oriented to person, place, and time.      Cranial Nerves: No cranial nerve deficit.      Motor: No abnormal muscle tone.   Psychiatric:         Behavior: Behavior normal.         Vital Signs  ED Triage Vitals   Temperature Pulse Respirations Blood Pressure SpO2   05/11/24 1611 05/11/24 1611 05/11/24 1611 05/11/24 1611 05/11/24 1611   98.4 °F (36.9 °C) (!) 47 22 (!) 186/79 96 %      Temp Source Heart Rate Source Patient Position - Orthostatic VS BP Location FiO2 (%)   05/11/24 1611 05/11/24 1611 05/11/24 1611 05/11/24 1611 --   Oral Monitor Sitting Left arm       Pain Score       05/11/24 2241       No Pain           Vitals:    05/13/24 0716 05/13/24 0945 05/13/24 1058 05/13/24 1506   BP: 156/62 130/56 130/56 131/76   Pulse: (!) 51 73     Patient Position - Orthostatic VS: Lying            Visual Acuity      ED Medications  Medications   sodium chloride 0.9 % bolus 1,000 mL (0 mL Intravenous Stopped 5/11/24 1900)   magnesium sulfate 4 g/100 mL IVPB (premix) 4 g (0 g Intravenous Stopped 5/13/24 0850)       Diagnostic Studies  Results Reviewed       Procedure Component Value Units Date/Time    Hemoglobin A1C w/ EAG Estimation [[id number]]  (Abnormal) Collected: 05/11/24 1718    Lab Status: Final result Specimen: Blood from Arm, Left Updated: 05/12/24 1506     Hemoglobin A1C 7.7 %       mg/dl     CBC and differential [[id number]]  (Abnormal) Collected: 05/12/24 0432    Lab Status: Final result Specimen: Blood from Arm, Right Updated: 05/12/24 0551     WBC 7.09 Thousand/uL      RBC 3.72 Million/uL      Hemoglobin 12.5 g/dL      Hematocrit 38.6 %       fL      MCH 33.6 pg      MCHC 32.4 g/dL      RDW 12.7 %      MPV 11.7 fL      Platelets 144 Thousands/uL      nRBC 0 /100 WBCs      Segmented  % 57 %      Immature Grans % 0 %      Lymphocytes % 23 %      Monocytes % 13 %      Eosinophils Relative 6 %      Basophils Relative 1 %      Absolute Neutrophils 4.08 Thousands/µL      Absolute Immature Grans 0.01 Thousand/uL      Absolute Lymphocytes 1.61 Thousands/µL      Absolute Monocytes 0.89 Thousand/µL      Eosinophils Absolute 0.41 Thousand/µL      Basophils Absolute 0.09 Thousands/µL     Comprehensive metabolic panel [[id number]]  (Abnormal) Collected: 05/12/24 0432    Lab Status: Final result Specimen: Blood from Arm, Right Updated: 05/12/24 0534     Sodium 140 mmol/L      Potassium 4.6 mmol/L      Chloride 105 mmol/L      CO2 28 mmol/L      ANION GAP 7 mmol/L      BUN 16 mg/dL      Creatinine 1.05 mg/dL      Glucose 118 mg/dL      Glucose, Fasting 118 mg/dL      Calcium 8.7 mg/dL      AST 33 U/L      ALT 50 U/L      Alkaline Phosphatase 76 U/L      Total Protein 6.5 g/dL      Albumin 3.5 g/dL      Total Bilirubin 0.81 mg/dL      eGFR 66 ml/min/1.73sq m     Narrative:      National Kidney Disease Foundation guidelines for Chronic Kidney Disease (CKD):     Stage 1 with normal or high GFR (GFR > 90 mL/min/1.73 square meters)    Stage 2 Mild CKD (GFR = 60-89 mL/min/1.73 square meters)    Stage 3A Moderate CKD (GFR = 45-59 mL/min/1.73 square meters)    Stage 3B Moderate CKD (GFR = 30-44 mL/min/1.73 square meters)    Stage 4 Severe CKD (GFR = 15-29 mL/min/1.73 square meters)    Stage 5 End Stage CKD (GFR <15 mL/min/1.73 square meters)  Note: GFR calculation is accurate only with a steady state creatinine    Magnesium [[id number]]  (Abnormal) Collected: 05/12/24 0432    Lab Status: Final result Specimen: Blood from Arm, Right Updated: 05/12/24 0534     Magnesium 1.5 mg/dL     HS Troponin I 2hr [[id number]]  (Normal) Collected: 05/11/24 1901    Lab Status: Final result Specimen: Blood from Arm, Left Updated: 05/11/24 1948     hs TnI 2hr 13 ng/L      Delta 2hr hsTnI -3 ng/L     HS Troponin 0hr (reflex protocol)  [[id number]]  (Normal) Collected: 05/11/24 1718    Lab Status: Final result Specimen: Blood from Arm, Left Updated: 05/11/24 1800     hs TnI 0hr 16 ng/L     Comprehensive metabolic panel [[id number]]  (Abnormal) Collected: 05/11/24 1718    Lab Status: Final result Specimen: Blood from Arm, Left Updated: 05/11/24 1752     Sodium 137 mmol/L      Potassium 5.2 mmol/L      Chloride 104 mmol/L      CO2 27 mmol/L      ANION GAP 6 mmol/L      BUN 21 mg/dL      Creatinine 1.25 mg/dL      Glucose 114 mg/dL      Calcium 8.9 mg/dL      AST 35 U/L      ALT 53 U/L      Alkaline Phosphatase 87 U/L      Total Protein 7.1 g/dL      Albumin 4.0 g/dL      Total Bilirubin 0.60 mg/dL      eGFR 53 ml/min/1.73sq m     Narrative:      National Kidney Disease Foundation guidelines for Chronic Kidney Disease (CKD):     Stage 1 with normal or high GFR (GFR > 90 mL/min/1.73 square meters)    Stage 2 Mild CKD (GFR = 60-89 mL/min/1.73 square meters)    Stage 3A Moderate CKD (GFR = 45-59 mL/min/1.73 square meters)    Stage 3B Moderate CKD (GFR = 30-44 mL/min/1.73 square meters)    Stage 4 Severe CKD (GFR = 15-29 mL/min/1.73 square meters)    Stage 5 End Stage CKD (GFR <15 mL/min/1.73 square meters)  Note: GFR calculation is accurate only with a steady state creatinine    CBC and differential [[id number]]  (Abnormal) Collected: 05/11/24 1718    Lab Status: Final result Specimen: Blood from Arm, Left Updated: 05/11/24 1731     WBC 7.47 Thousand/uL      RBC 3.81 Million/uL      Hemoglobin 12.9 g/dL      Hematocrit 39.2 %       fL      MCH 33.9 pg      MCHC 32.9 g/dL      RDW 12.7 %      MPV 11.5 fL      Platelets 154 Thousands/uL      nRBC 0 /100 WBCs      Segmented % 65 %      Immature Grans % 0 %      Lymphocytes % 17 %      Monocytes % 13 %      Eosinophils Relative 4 %      Basophils Relative 1 %      Absolute Neutrophils 4.76 Thousands/µL      Absolute Immature Grans 0.02 Thousand/uL      Absolute Lymphocytes 1.30 Thousands/µL       Absolute Monocytes 0.98 Thousand/µL      Eosinophils Absolute 0.32 Thousand/µL      Basophils Absolute 0.09 Thousands/µL     Fingerstick Glucose (POCT) [[id number]]  (Normal) Collected: 05/11/24 1614    Lab Status: Final result Specimen: Blood Updated: 05/11/24 1615     POC Glucose 124 mg/dl                    XR chest 1 view portable   Final Result by Monica Fernandez MD (05/12 0742)      No acute cardiopulmonary disease.            Workstation performed: [id number]                    Procedures  Procedures         ED Course                               SBIRT 20yo+      Flowsheet Row Most Recent Value   Initial Alcohol Screen: US AUDIT-C     1. How often do you have a drink containing alcohol? 0 Filed at: 05/11/2024 1616   2. How many drinks containing alcohol do you have on a typical day you are drinking?  0 Filed at: 05/11/2024 1616   3b. FEMALE Any Age, or MALE 65+: How often do you have 4 or more drinks on one occassion? 0 Filed at: 05/11/2024 1616   Audit-C Score 0 Filed at: 05/11/2024 1616   HARI: How many times in the past year have you...    Used an illegal drug or used a prescription medication for non-medical reasons? Never Filed at: 05/11/2024 1616                      Medical Decision Making  83-year-old male with syncopal episodes, bradycardic here, likely secondary to medication, recommend hydration, admission, continuous cardiac monitoring.    Amount and/or Complexity of Data Reviewed  Labs: ordered.    Risk  Decision regarding hospitalization.             Disposition  Final diagnoses:   Bradycardia     Time reflects when diagnosis was documented in both MDM as applicable and the Disposition within this note       Time User Action Codes Description Comment    5/11/2024  9:35 PM Marcelo Lucas Add [R00.1] Bradycardia           ED Disposition       ED Disposition   Admit    Condition   Stable    Date/Time   Sat May 11, 2024 2130    Comment   Case was discussed with SAVANNAH and the patient's admission  status was agreed to be Admission Status: observation status to the service of Dr. Colvin   .               Follow-up Information       Follow up With Specialties Details Why Contact Info    [facility] Follow up  [address] [id number]  [phone]              Discharge Medication List as of 5/13/2024  4:03 PM        CONTINUE these medications which have NOT CHANGED    Details   aspirin (ECOTRIN LOW STRENGTH) 81 mg EC tablet Historical Med      atorvastatin (LIPITOR) 10 mg tablet Historical Med      Fish Oil Triglycerides 10 GM/100ML EMUL Historical Med      meloxicam (MOBIC) 15 mg tablet Take 15 mg by mouth daily, Historical Med      Metformin HCl (RIOMET) 500 MG/5ML oral solution Starting Fri 1/20/2023, Historical Med      methenamine hippurate (HIPREX) 1 g tablet Starting Wed 6/1/2022, Historical Med      Misc Natural Products (Tart [place] Advanced) CAPS Starting Tue 2/1/2022, Historical Med      olmesartan-hydrochlorothiazide (BENICAR HCT) 40-25 MG per tablet 1 tab(s), Historical Med      omeprazole (PriLOSEC) 20 mg delayed release capsule Historical Med      Probiotic Product (PROBIOTIC-10 PO) Starting Fri 6/3/2022, Historical Med             Outpatient Discharge Orders   Ambulatory referral to Cardiology   Standing Status: Future Standing Exp. Date: 05/13/25      Discharge Diet     Activity as tolerated     Call provider for:  persistent dizziness or light-headedness     AMB event recorder   Standing Status: Future Standing Exp. Date: 05/13/28       PDMP Review       None            ED Provider  Electronically Signed by             Marcelo Lucas MD  05/17/24 0052